# Patient Record
Sex: FEMALE | Race: BLACK OR AFRICAN AMERICAN | NOT HISPANIC OR LATINO | Employment: FULL TIME | URBAN - METROPOLITAN AREA
[De-identification: names, ages, dates, MRNs, and addresses within clinical notes are randomized per-mention and may not be internally consistent; named-entity substitution may affect disease eponyms.]

---

## 2022-11-01 ENCOUNTER — HOSPITAL ENCOUNTER (INPATIENT)
Facility: OTHER | Age: 62
LOS: 6 days | Discharge: HOME HEALTH | DRG: 871 | End: 2022-11-07
Attending: EMERGENCY MEDICINE | Admitting: HOSPITALIST
Payer: MEDICARE

## 2022-11-01 DIAGNOSIS — M25.552 HIP PAIN, ACUTE, LEFT: ICD-10-CM

## 2022-11-01 DIAGNOSIS — R94.31 PROLONGED Q-T INTERVAL ON ECG: ICD-10-CM

## 2022-11-01 DIAGNOSIS — J18.9 PNEUMONIA DUE TO INFECTIOUS ORGANISM, UNSPECIFIED LATERALITY, UNSPECIFIED PART OF LUNG: ICD-10-CM

## 2022-11-01 DIAGNOSIS — R78.81 GRAM-POSITIVE BACTEREMIA: Primary | ICD-10-CM

## 2022-11-01 DIAGNOSIS — R74.01 TRANSAMINITIS: ICD-10-CM

## 2022-11-01 DIAGNOSIS — M54.50 ACUTE LEFT-SIDED LOW BACK PAIN WITHOUT SCIATICA: ICD-10-CM

## 2022-11-01 DIAGNOSIS — N17.9 AKI (ACUTE KIDNEY INJURY): ICD-10-CM

## 2022-11-01 DIAGNOSIS — R78.81 BACTEREMIA: ICD-10-CM

## 2022-11-01 DIAGNOSIS — J18.9 PNEUMONIA: ICD-10-CM

## 2022-11-01 DIAGNOSIS — R07.9 CHEST PAIN: ICD-10-CM

## 2022-11-01 DIAGNOSIS — A41.9 SEPSIS, DUE TO UNSPECIFIED ORGANISM, UNSPECIFIED WHETHER ACUTE ORGAN DYSFUNCTION PRESENT: ICD-10-CM

## 2022-11-01 DIAGNOSIS — R52 PAIN: ICD-10-CM

## 2022-11-01 PROBLEM — R65.20 SEPSIS WITH ACUTE ORGAN DYSFUNCTION WITHOUT SEPTIC SHOCK: Status: ACTIVE | Noted: 2022-11-01

## 2022-11-01 PROBLEM — I10 PRIMARY HYPERTENSION: Status: ACTIVE | Noted: 2022-11-01

## 2022-11-01 PROBLEM — E11.9 TYPE 2 DIABETES MELLITUS, WITHOUT LONG-TERM CURRENT USE OF INSULIN: Status: ACTIVE | Noted: 2022-11-01

## 2022-11-01 LAB
ALBUMIN SERPL BCP-MCNC: 3.1 G/DL (ref 3.5–5.2)
ALP SERPL-CCNC: 379 U/L (ref 55–135)
ALT SERPL W/O P-5'-P-CCNC: 229 U/L (ref 10–44)
ANION GAP SERPL CALC-SCNC: 14 MMOL/L (ref 8–16)
APAP SERPL-MCNC: <3 UG/ML (ref 10–20)
AST SERPL-CCNC: 197 U/L (ref 10–40)
BACTERIA #/AREA URNS HPF: ABNORMAL /HPF
BASOPHILS # BLD AUTO: 0.03 K/UL (ref 0–0.2)
BASOPHILS NFR BLD: 0.2 % (ref 0–1.9)
BILIRUB SERPL-MCNC: 2.7 MG/DL (ref 0.1–1)
BILIRUB UR QL STRIP: NEGATIVE
BUN SERPL-MCNC: 27 MG/DL (ref 8–23)
CALCIUM SERPL-MCNC: 10.3 MG/DL (ref 8.7–10.5)
CHLORIDE SERPL-SCNC: 102 MMOL/L (ref 95–110)
CLARITY UR: CLEAR
CO2 SERPL-SCNC: 21 MMOL/L (ref 23–29)
COLOR UR: YELLOW
CREAT SERPL-MCNC: 1.6 MG/DL (ref 0.5–1.4)
CREAT SERPL-MCNC: 1.7 MG/DL (ref 0.5–1.4)
CTP QC/QA: YES
CTP QC/QA: YES
DIFFERENTIAL METHOD: ABNORMAL
EOSINOPHIL # BLD AUTO: 0 K/UL (ref 0–0.5)
EOSINOPHIL NFR BLD: 0.2 % (ref 0–8)
ERYTHROCYTE [DISTWIDTH] IN BLOOD BY AUTOMATED COUNT: 14.3 % (ref 11.5–14.5)
EST. GFR  (NO RACE VARIABLE): 33 ML/MIN/1.73 M^2
GLUCOSE SERPL-MCNC: 185 MG/DL (ref 70–110)
GLUCOSE UR QL STRIP: NEGATIVE
HAV IGM SERPL QL IA: NORMAL
HBV CORE IGM SERPL QL IA: NORMAL
HBV SURFACE AG SERPL QL IA: NORMAL
HCT VFR BLD AUTO: 34.8 % (ref 37–48.5)
HCV AB SERPL QL IA: NORMAL
HGB BLD-MCNC: 11.2 G/DL (ref 12–16)
HGB UR QL STRIP: ABNORMAL
IMM GRANULOCYTES # BLD AUTO: 0.16 K/UL (ref 0–0.04)
IMM GRANULOCYTES NFR BLD AUTO: 0.9 % (ref 0–0.5)
INR PPP: 1.1 (ref 0.8–1.2)
KETONES UR QL STRIP: NEGATIVE
LACTATE SERPL-SCNC: 0.9 MMOL/L (ref 0.5–2.2)
LACTATE SERPL-SCNC: 0.9 MMOL/L (ref 0.5–2.2)
LACTATE SERPL-SCNC: 1.4 MMOL/L (ref 0.5–2.2)
LEUKOCYTE ESTERASE UR QL STRIP: ABNORMAL
LYMPHOCYTES # BLD AUTO: 1.1 K/UL (ref 1–4.8)
LYMPHOCYTES NFR BLD: 6.2 % (ref 18–48)
MCH RBC QN AUTO: 23.8 PG (ref 27–31)
MCHC RBC AUTO-ENTMCNC: 32.2 G/DL (ref 32–36)
MCV RBC AUTO: 74 FL (ref 82–98)
MICROSCOPIC COMMENT: ABNORMAL
MONOCYTES # BLD AUTO: 1 K/UL (ref 0.3–1)
MONOCYTES NFR BLD: 5.6 % (ref 4–15)
NEUTROPHILS # BLD AUTO: 15.3 K/UL (ref 1.8–7.7)
NEUTROPHILS NFR BLD: 86.9 % (ref 38–73)
NITRITE UR QL STRIP: NEGATIVE
NON-SQ EPI CELLS #/AREA URNS HPF: 3 /HPF
NRBC BLD-RTO: 0 /100 WBC
PH UR STRIP: 6 [PH] (ref 5–8)
PLATELET # BLD AUTO: 186 K/UL (ref 150–450)
PLATELET BLD QL SMEAR: ABNORMAL
PMV BLD AUTO: 12.9 FL (ref 9.2–12.9)
POC MOLECULAR INFLUENZA A AGN: NEGATIVE
POC MOLECULAR INFLUENZA B AGN: NEGATIVE
POCT GLUCOSE: 130 MG/DL (ref 70–110)
POCT GLUCOSE: 181 MG/DL (ref 70–110)
POCT GLUCOSE: 99 MG/DL (ref 70–110)
POTASSIUM SERPL-SCNC: 3.5 MMOL/L (ref 3.5–5.1)
PROCALCITONIN SERPL IA-MCNC: 8.05 NG/ML
PROT SERPL-MCNC: 8.1 G/DL (ref 6–8.4)
PROT UR QL STRIP: NEGATIVE
PROTHROMBIN TIME: 11.7 SEC (ref 9–12.5)
RBC # BLD AUTO: 4.7 M/UL (ref 4–5.4)
RBC #/AREA URNS HPF: 1 /HPF (ref 0–4)
SAMPLE: ABNORMAL
SARS-COV-2 RDRP RESP QL NAA+PROBE: NEGATIVE
SODIUM SERPL-SCNC: 137 MMOL/L (ref 136–145)
SP GR UR STRIP: 1.01 (ref 1–1.03)
SQUAMOUS #/AREA URNS HPF: 3 /HPF
TROPONIN I SERPL DL<=0.01 NG/ML-MCNC: 0.01 NG/ML (ref 0–0.03)
TSH SERPL DL<=0.005 MIU/L-ACNC: 1.86 UIU/ML (ref 0.4–4)
URN SPEC COLLECT METH UR: ABNORMAL
UROBILINOGEN UR STRIP-ACNC: 1 EU/DL
WBC # BLD AUTO: 17.56 K/UL (ref 3.9–12.7)
WBC #/AREA URNS HPF: 51 /HPF (ref 0–5)
WBC CLUMPS URNS QL MICRO: ABNORMAL

## 2022-11-01 PROCEDURE — 80074 ACUTE HEPATITIS PANEL: CPT | Performed by: PHYSICIAN ASSISTANT

## 2022-11-01 PROCEDURE — 84484 ASSAY OF TROPONIN QUANT: CPT | Performed by: PHYSICIAN ASSISTANT

## 2022-11-01 PROCEDURE — 81000 URINALYSIS NONAUTO W/SCOPE: CPT | Performed by: PHYSICIAN ASSISTANT

## 2022-11-01 PROCEDURE — 87040 BLOOD CULTURE FOR BACTERIA: CPT | Mod: 59 | Performed by: PHYSICIAN ASSISTANT

## 2022-11-01 PROCEDURE — 99223 1ST HOSP IP/OBS HIGH 75: CPT | Mod: AI,,, | Performed by: NURSE PRACTITIONER

## 2022-11-01 PROCEDURE — 96361 HYDRATE IV INFUSION ADD-ON: CPT

## 2022-11-01 PROCEDURE — S0030 INJECTION, METRONIDAZOLE: HCPCS | Performed by: PHYSICIAN ASSISTANT

## 2022-11-01 PROCEDURE — 36415 COLL VENOUS BLD VENIPUNCTURE: CPT | Performed by: NURSE PRACTITIONER

## 2022-11-01 PROCEDURE — 36415 COLL VENOUS BLD VENIPUNCTURE: CPT | Performed by: PHYSICIAN ASSISTANT

## 2022-11-01 PROCEDURE — 82962 GLUCOSE BLOOD TEST: CPT

## 2022-11-01 PROCEDURE — 25000003 PHARM REV CODE 250: Performed by: NURSE PRACTITIONER

## 2022-11-01 PROCEDURE — 80143 DRUG ASSAY ACETAMINOPHEN: CPT | Performed by: PHYSICIAN ASSISTANT

## 2022-11-01 PROCEDURE — 25000003 PHARM REV CODE 250: Performed by: PHYSICIAN ASSISTANT

## 2022-11-01 PROCEDURE — 85610 PROTHROMBIN TIME: CPT | Performed by: PHYSICIAN ASSISTANT

## 2022-11-01 PROCEDURE — 87086 URINE CULTURE/COLONY COUNT: CPT | Performed by: PHYSICIAN ASSISTANT

## 2022-11-01 PROCEDURE — 99285 EMERGENCY DEPT VISIT HI MDM: CPT | Mod: 25

## 2022-11-01 PROCEDURE — 93005 ELECTROCARDIOGRAM TRACING: CPT

## 2022-11-01 PROCEDURE — 63600175 PHARM REV CODE 636 W HCPCS: Performed by: NURSE PRACTITIONER

## 2022-11-01 PROCEDURE — 87077 CULTURE AEROBIC IDENTIFY: CPT | Performed by: PHYSICIAN ASSISTANT

## 2022-11-01 PROCEDURE — 96367 TX/PROPH/DG ADDL SEQ IV INF: CPT

## 2022-11-01 PROCEDURE — 21400001 HC TELEMETRY ROOM

## 2022-11-01 PROCEDURE — 85025 COMPLETE CBC W/AUTO DIFF WBC: CPT | Performed by: PHYSICIAN ASSISTANT

## 2022-11-01 PROCEDURE — 96365 THER/PROPH/DIAG IV INF INIT: CPT

## 2022-11-01 PROCEDURE — 87186 SC STD MICRODIL/AGAR DIL: CPT | Performed by: PHYSICIAN ASSISTANT

## 2022-11-01 PROCEDURE — 99223 PR INITIAL HOSPITAL CARE,LEVL III: ICD-10-PCS | Mod: AI,,, | Performed by: NURSE PRACTITIONER

## 2022-11-01 PROCEDURE — 93010 EKG 12-LEAD: ICD-10-PCS | Mod: ,,, | Performed by: INTERNAL MEDICINE

## 2022-11-01 PROCEDURE — S0073 INJECTION, AZTREONAM, 500 MG: HCPCS | Performed by: PHYSICIAN ASSISTANT

## 2022-11-01 PROCEDURE — 87635 SARS-COV-2 COVID-19 AMP PRB: CPT | Performed by: PHYSICIAN ASSISTANT

## 2022-11-01 PROCEDURE — 83605 ASSAY OF LACTIC ACID: CPT | Mod: 91 | Performed by: NURSE PRACTITIONER

## 2022-11-01 PROCEDURE — 83605 ASSAY OF LACTIC ACID: CPT | Mod: 91 | Performed by: PHYSICIAN ASSISTANT

## 2022-11-01 PROCEDURE — 84145 PROCALCITONIN (PCT): CPT | Performed by: PHYSICIAN ASSISTANT

## 2022-11-01 PROCEDURE — 63600175 PHARM REV CODE 636 W HCPCS: Performed by: PHYSICIAN ASSISTANT

## 2022-11-01 PROCEDURE — 80053 COMPREHEN METABOLIC PANEL: CPT | Performed by: PHYSICIAN ASSISTANT

## 2022-11-01 PROCEDURE — 84443 ASSAY THYROID STIM HORMONE: CPT | Performed by: PHYSICIAN ASSISTANT

## 2022-11-01 PROCEDURE — 93010 ELECTROCARDIOGRAM REPORT: CPT | Mod: ,,, | Performed by: INTERNAL MEDICINE

## 2022-11-01 RX ORDER — GLUCAGON 1 MG
1 KIT INJECTION
Status: DISCONTINUED | OUTPATIENT
Start: 2022-11-01 | End: 2022-11-07 | Stop reason: HOSPADM

## 2022-11-01 RX ORDER — LOSARTAN POTASSIUM 100 MG/1
100 TABLET ORAL DAILY
Status: ON HOLD | COMMUNITY
Start: 2022-08-16 | End: 2022-11-07 | Stop reason: SDUPTHER

## 2022-11-01 RX ORDER — METRONIDAZOLE 500 MG/100ML
500 INJECTION, SOLUTION INTRAVENOUS
Status: DISCONTINUED | OUTPATIENT
Start: 2022-11-01 | End: 2022-11-01

## 2022-11-01 RX ORDER — GABAPENTIN 100 MG/1
200 CAPSULE ORAL 2 TIMES DAILY
Status: DISCONTINUED | OUTPATIENT
Start: 2022-11-02 | End: 2022-11-07 | Stop reason: HOSPADM

## 2022-11-01 RX ORDER — AMLODIPINE BESYLATE 5 MG/1
5 TABLET ORAL DAILY
Status: DISCONTINUED | OUTPATIENT
Start: 2022-11-01 | End: 2022-11-02

## 2022-11-01 RX ORDER — AMLODIPINE BESYLATE 5 MG/1
5 TABLET ORAL DAILY
Status: DISCONTINUED | OUTPATIENT
Start: 2022-11-02 | End: 2022-11-01

## 2022-11-01 RX ORDER — IBUPROFEN 200 MG
24 TABLET ORAL
Status: DISCONTINUED | OUTPATIENT
Start: 2022-11-01 | End: 2022-11-07 | Stop reason: HOSPADM

## 2022-11-01 RX ORDER — INSULIN ASPART 100 [IU]/ML
0-5 INJECTION, SOLUTION INTRAVENOUS; SUBCUTANEOUS
Status: DISCONTINUED | OUTPATIENT
Start: 2022-11-01 | End: 2022-11-07 | Stop reason: HOSPADM

## 2022-11-01 RX ORDER — ONDANSETRON 2 MG/ML
4 INJECTION INTRAMUSCULAR; INTRAVENOUS EVERY 6 HOURS PRN
Status: DISCONTINUED | OUTPATIENT
Start: 2022-11-01 | End: 2022-11-07 | Stop reason: HOSPADM

## 2022-11-01 RX ORDER — IBUPROFEN 600 MG/1
600 TABLET ORAL EVERY 6 HOURS PRN
Status: DISCONTINUED | OUTPATIENT
Start: 2022-11-01 | End: 2022-11-02

## 2022-11-01 RX ORDER — METHOCARBAMOL 750 MG/1
750 TABLET, FILM COATED ORAL ONCE
Status: COMPLETED | OUTPATIENT
Start: 2022-11-01 | End: 2022-11-01

## 2022-11-01 RX ORDER — SODIUM CHLORIDE 9 MG/ML
INJECTION, SOLUTION INTRAVENOUS CONTINUOUS
Status: DISCONTINUED | OUTPATIENT
Start: 2022-11-01 | End: 2022-11-03

## 2022-11-01 RX ORDER — ENOXAPARIN SODIUM 100 MG/ML
40 INJECTION SUBCUTANEOUS EVERY 24 HOURS
Status: DISCONTINUED | OUTPATIENT
Start: 2022-11-02 | End: 2022-11-07 | Stop reason: HOSPADM

## 2022-11-01 RX ORDER — IBRUTINIB 420 MG/1
1 TABLET, FILM COATED ORAL DAILY
COMMUNITY
Start: 2022-10-20

## 2022-11-01 RX ORDER — TIMOLOL MALEATE 5 MG/ML
1 SOLUTION OPHTHALMIC 2 TIMES DAILY
COMMUNITY
Start: 2022-10-17

## 2022-11-01 RX ORDER — IBUPROFEN 200 MG
16 TABLET ORAL
Status: DISCONTINUED | OUTPATIENT
Start: 2022-11-01 | End: 2022-11-07 | Stop reason: HOSPADM

## 2022-11-01 RX ORDER — LIDOCAINE 50 MG/G
1 PATCH TOPICAL
Status: DISCONTINUED | OUTPATIENT
Start: 2022-11-01 | End: 2022-11-07 | Stop reason: HOSPADM

## 2022-11-01 RX ORDER — CHLORTHALIDONE 25 MG/1
12.5 TABLET ORAL DAILY
Status: ON HOLD | COMMUNITY
Start: 2022-09-08 | End: 2022-11-07 | Stop reason: HOSPADM

## 2022-11-01 RX ORDER — POLYETHYLENE GLYCOL 3350 17 G/17G
17 POWDER, FOR SOLUTION ORAL 2 TIMES DAILY PRN
Status: DISCONTINUED | OUTPATIENT
Start: 2022-11-01 | End: 2022-11-07 | Stop reason: HOSPADM

## 2022-11-01 RX ORDER — HYDROCODONE BITARTRATE AND ACETAMINOPHEN 5; 325 MG/1; MG/1
1 TABLET ORAL EVERY 6 HOURS PRN
Status: DISCONTINUED | OUTPATIENT
Start: 2022-11-01 | End: 2022-11-02

## 2022-11-01 RX ORDER — AMLODIPINE BESYLATE 5 MG/1
5 TABLET ORAL DAILY
Status: ON HOLD | COMMUNITY
Start: 2022-11-01 | End: 2022-11-07 | Stop reason: HOSPADM

## 2022-11-01 RX ORDER — TIMOLOL MALEATE 2.5 MG/ML
1 SOLUTION/ DROPS OPHTHALMIC 2 TIMES DAILY
Status: DISCONTINUED | OUTPATIENT
Start: 2022-11-01 | End: 2022-11-07 | Stop reason: HOSPADM

## 2022-11-01 RX ORDER — GABAPENTIN 100 MG/1
200 CAPSULE ORAL 3 TIMES DAILY
Status: DISCONTINUED | OUTPATIENT
Start: 2022-11-01 | End: 2022-11-01

## 2022-11-01 RX ORDER — NALOXONE HCL 0.4 MG/ML
0.02 VIAL (ML) INJECTION
Status: DISCONTINUED | OUTPATIENT
Start: 2022-11-01 | End: 2022-11-07 | Stop reason: HOSPADM

## 2022-11-01 RX ADMIN — SODIUM CHLORIDE, SODIUM LACTATE, POTASSIUM CHLORIDE, AND CALCIUM CHLORIDE 1400 ML: .6; .31; .03; .02 INJECTION, SOLUTION INTRAVENOUS at 10:11

## 2022-11-01 RX ADMIN — CEFTRIAXONE 1 G: 1 INJECTION, SOLUTION INTRAVENOUS at 09:11

## 2022-11-01 RX ADMIN — LIDOCAINE 1 PATCH: 50 PATCH CUTANEOUS at 09:11

## 2022-11-01 RX ADMIN — VANCOMYCIN HYDROCHLORIDE 1500 MG: 1.5 INJECTION, POWDER, LYOPHILIZED, FOR SOLUTION INTRAVENOUS at 01:11

## 2022-11-01 RX ADMIN — AMLODIPINE BESYLATE 5 MG: 5 TABLET ORAL at 09:11

## 2022-11-01 RX ADMIN — TIMOLOL MALEATE 1 DROP: 2.5 SOLUTION/ DROPS OPHTHALMIC at 09:11

## 2022-11-01 RX ADMIN — AZTREONAM 2000 MG: 2 INJECTION, POWDER, LYOPHILIZED, FOR SOLUTION INTRAMUSCULAR; INTRAVENOUS at 12:11

## 2022-11-01 RX ADMIN — METRONIDAZOLE 500 MG: 5 INJECTION, SOLUTION INTRAVENOUS at 07:11

## 2022-11-01 RX ADMIN — SODIUM CHLORIDE: 0.9 INJECTION, SOLUTION INTRAVENOUS at 04:11

## 2022-11-01 RX ADMIN — METHOCARBAMOL 750 MG: 750 TABLET ORAL at 09:11

## 2022-11-01 RX ADMIN — HYDROCODONE BITARTRATE AND ACETAMINOPHEN 1 TABLET: 5; 325 TABLET ORAL at 09:11

## 2022-11-01 RX ADMIN — SODIUM CHLORIDE 1000 ML: 0.9 INJECTION, SOLUTION INTRAVENOUS at 09:11

## 2022-11-01 RX ADMIN — GABAPENTIN 200 MG: 100 CAPSULE ORAL at 04:11

## 2022-11-01 RX ADMIN — METRONIDAZOLE 500 MG: 5 INJECTION, SOLUTION INTRAVENOUS at 11:11

## 2022-11-01 NOTE — ED PROVIDER NOTES
Source of History:  Patient    Chief complaint:  Back Pain (Pt c.o left lower back pain that radiates to buttock and around in groin onset off and on for 2 weeks worsened over last 2 days.  Pt denies urinary symptoms.  Pt denies loss of bowel or bladder.  Pt states she has not been eating and drinking for last 2 day due to no appetite due to pain. Pt c.o feeling lightheaded. ) and hypotension      HPI:  Brigitte Sorto is a 62 y.o. female presenting with multiple complaints.  Patient states that she has left lower back pain that radiates to buttock and anterior hip.  States this has been ongoing intermittently for some time however worsened over the past 2 weeks.  She states over the past 2 days the pain was more significant and was exacerbated with bearing weight.  She states that secondary to this she was spending more time lying down.  She states this prompted her to have decreased intake.  She states today she felt some lightheadedness.  Denies any chest pain, shortness of breath abdominal pain urinary symptoms.  Denies any bowel or bladder incontinence.  Denies any unilateral weakness.    This is the extent to the patients complaints today here in the emergency department.    ROS: As per HPI and below:  Constitutional: No fever.  No chills, fatigue  Eyes: No visual changes.  ENT: No sore throat. No ear pain    Cardiovascular: No chest pain.  Respiratory: No shortness of breath.  GI: No abdominal pain.  No nausea or vomiting.  Genitourinary: No abnormal urination.  Neurologic: No headache. No focal weakness.  No numbness.  MSK:  Left lower back, buttock and groin pain; no unilateral weakness, no bowel or bladder incontinence  Integument: No rashes or lesions.  Hematologic: No easy bruising.  Endocrine: No excessive thirst or urination.    Review of patient's allergies indicates:   Allergen Reactions    Amoxicillin        PMH:  As per HPI and below:  Past Medical History:   Diagnosis Date    Cancer 5yrs ago  "   leukemia     CLL (chronic lymphocytic leukemia)     Diabetes mellitus      History reviewed. No pertinent surgical history.    Social History     Tobacco Use    Smoking status: Never    Smokeless tobacco: Never   Substance Use Topics    Alcohol use: No    Drug use: No       Physical Exam:    /61 (BP Location: Left arm, Patient Position: Lying)   Pulse 85   Temp 98.6 °F (37 °C) (Oral)   Resp 18   Ht 5' 5" (1.651 m)   Wt 78 kg (172 lb)   SpO2 97%   BMI 28.62 kg/m²   Nursing note and vital signs reviewed.  Constitutional: No acute distress.  Nontoxic  Eyes: No conjunctival injection.Extraocular muscles are intact.  ENT: Oropharynx clear.  Normal phonation.  Mucous membranes are dry.  Cardiovascular: Regular rate and rhythm.  No murmurs. No gallops. No rubs  Respiratory: Clear to auscultation bilaterally.  Good air movement.  No wheezes.  No rhonchi. No rales. No accessory muscle use..  Abdomen: Soft.  Not distended.  Nontender.  No guarding.  No rebound. Non-peritoneal.  Musculoskeletal: Good range of motion all joints.  No deformities.  Neck supple.  No meningismus.  Skin: No rashes seen.  Good turgor.  No abrasions.  No ecchymoses.  Neurologic: Motor intact.  Sensation intact.  No ataxia. No focal neurological deficits.  Psych: Appropriate, conversant    Labs that have been ordered have been independently reviewed and interpreted by myself.    I decided to obtain the patient's medical records.    MDM/ Differential Dx:    Brigitte Sorto 62 y.o. presented to the ED with c/o nontraumatic low back and left buttock pain.  Also reports lightheadedness.  Physical exam reveals ill however nontoxic.  Vitals are notable for hypotension.  Mucous membranes are dry.  No focal neuro deficit.  TTP of left lower paraspinal muscles and SI joint and left buttock with negative straight leg raise.  Strength equal bilaterally.  Abdomen is soft and nontender.    Differential Diagnosis includes, but is not limited " to:  Cauda equina syndrome, diskitis/osteomyelitis, epidural/paraspinal abscess, AAA, aortic dissection, post-op/hardware infection, trauma/vertebral fracture, spinal cord injury, disc herniation, spinal stenosis, sciatica, radiculopathy, neoplasm, lumbar muscle strain, muscle spasm, neuropathic pain, UTI/pyelonephritis, nephrolithiasis.    ED management:  Plan for imaging of lumbar, hip and pelvis and chest given her known history of cancer and nontraumatic back pain.  X-rays unremarkable.  Patient evaluated also for lightheadedness as she was noted to be hypotensive.  Responding well to IV fluids.  As noted previously given her leukocytosis which was expected although now left shift and presentation concern of sepsis.  Initial fluid bolus as per sepsis guidelines , lactic ordered.  Urine is pending.  Chest x-ray reveals questionable bilateral patchy opacities which could be related to pneumonia.  Given the transaminitis and this will start broad spectrum antibiotics.  We will also add acute hepatitis, Tylenol and ultrasound of gallbladder.  Ultrasound with no acute abnormalities.  Continues to respond to IV fluids.  Plan for admission for further monitoring and further evaluation of transaminitis and pneumonia.    Impression/Plan: Patient informed of diagnosis The primary encounter diagnosis was Acute left-sided low back pain without sciatica. Diagnoses of Pain, Sepsis, due to unspecified organism, unspecified whether acute organ dysfunction present, Pneumonia due to infectious organism, unspecified laterality, unspecified part of lung, Transaminitis, Pneumonia, Chest pain, and BAY (acute kidney injury) were also pertinent to this visit.       Results for orders placed or performed during the hospital encounter of 11/01/22   Blood culture x two cultures. Draw prior to antibiotics.    Specimen: Blood   Result Value Ref Range    Blood Culture, Routine       Gram stain aer bottle: Gram positive cocci in chains  resembling Strep    Blood Culture, Routine       Gram stain mary jane bottle: Gram positive cocci in chains resembling Strep    Blood Culture, Routine       Results called to and read back by: Joe Alegre RN 11/02/2022  05:00   Blood culture x two cultures. Draw prior to antibiotics.    Specimen: Blood   Result Value Ref Range    Blood Culture, Routine       Gram stain aer bottle: Gram positive cocci in chains resembling Strep    Blood Culture, Routine       Gram stain mary jane bottle: Gram positive cocci in chains resembling Strep    Blood Culture, Routine       Results called to and read back by: Joe Alegre RN 11/02/2022  05:00   CBC W/ AUTO DIFFERENTIAL   Result Value Ref Range    WBC 17.56 (H) 3.90 - 12.70 K/uL    RBC 4.70 4.00 - 5.40 M/uL    Hemoglobin 11.2 (L) 12.0 - 16.0 g/dL    Hematocrit 34.8 (L) 37.0 - 48.5 %    MCV 74 (L) 82 - 98 fL    MCH 23.8 (L) 27.0 - 31.0 pg    MCHC 32.2 32.0 - 36.0 g/dL    RDW 14.3 11.5 - 14.5 %    Platelets 186 150 - 450 K/uL    MPV 12.9 9.2 - 12.9 fL    Immature Granulocytes 0.9 (H) 0.0 - 0.5 %    Gran # (ANC) 15.3 (H) 1.8 - 7.7 K/uL    Immature Grans (Abs) 0.16 (H) 0.00 - 0.04 K/uL    Lymph # 1.1 1.0 - 4.8 K/uL    Mono # 1.0 0.3 - 1.0 K/uL    Eos # 0.0 0.0 - 0.5 K/uL    Baso # 0.03 0.00 - 0.20 K/uL    nRBC 0 0 /100 WBC    Gran % 86.9 (H) 38.0 - 73.0 %    Lymph % 6.2 (L) 18.0 - 48.0 %    Mono % 5.6 4.0 - 15.0 %    Eosinophil % 0.2 0.0 - 8.0 %    Basophil % 0.2 0.0 - 1.9 %    Platelet Estimate Appears normal     Differential Method Automated    Comp. Metabolic Panel   Result Value Ref Range    Sodium 137 136 - 145 mmol/L    Potassium 3.5 3.5 - 5.1 mmol/L    Chloride 102 95 - 110 mmol/L    CO2 21 (L) 23 - 29 mmol/L    Glucose 185 (H) 70 - 110 mg/dL    BUN 27 (H) 8 - 23 mg/dL    Creatinine 1.7 (H) 0.5 - 1.4 mg/dL    Calcium 10.3 8.7 - 10.5 mg/dL    Total Protein 8.1 6.0 - 8.4 g/dL    Albumin 3.1 (L) 3.5 - 5.2 g/dL    Total Bilirubin 2.7 (H) 0.1 - 1.0 mg/dL    Alkaline Phosphatase 379 (H)  55 - 135 U/L     (H) 10 - 40 U/L     (H) 10 - 44 U/L    Anion Gap 14 8 - 16 mmol/L    eGFR 33 (A) >60 mL/min/1.73 m^2   Troponin I   Result Value Ref Range    Troponin I 0.006 0.000 - 0.026 ng/mL   TSH   Result Value Ref Range    TSH 1.859 0.400 - 4.000 uIU/mL   Urinalysis, Reflex to Urine Culture Urine, Clean Catch    Specimen: Urine   Result Value Ref Range    Specimen UA Urine, Clean Catch     Color, UA Yellow Yellow, Straw, Sandra    Appearance, UA Clear Clear    pH, UA 6.0 5.0 - 8.0    Specific Gravity, UA 1.010 1.005 - 1.030    Protein, UA Negative Negative    Glucose, UA Negative Negative    Ketones, UA Negative Negative    Bilirubin (UA) Negative Negative    Occult Blood UA Trace (A) Negative    Nitrite, UA Negative Negative    Urobilinogen, UA 1.0 <2.0 EU/dL    Leukocytes, UA 3+ (A) Negative   Lactic Acid, Plasma #1   Result Value Ref Range    Lactate (Lactic Acid) 1.4 0.5 - 2.2 mmol/L   Lactic acid, plasma #2   Result Value Ref Range    Lactate (Lactic Acid) 0.9 0.5 - 2.2 mmol/L   Procalcitonin   Result Value Ref Range    Procalcitonin 8.05 (H) <0.25 ng/mL   Protime-INR   Result Value Ref Range    Prothrombin Time 11.7 9.0 - 12.5 sec    INR 1.1 0.8 - 1.2   Hepatitis panel, acute   Result Value Ref Range    Hepatitis B Surface Ag Non-reactive Non-reactive    Hep B C IgM Non-reactive Non-reactive    Hep A IgM Non-reactive Non-reactive    Hepatitis C Ab Non-reactive Non-reactive   Acetaminophen level   Result Value Ref Range    Acetaminophen (Tylenol), Serum <3.0 (L) 10.0 - 20.0 ug/mL   Urinalysis Microscopic   Result Value Ref Range    RBC, UA 1 0 - 4 /hpf    WBC, UA 51 (H) 0 - 5 /hpf    WBC Clumps, UA Rare None-Rare    Bacteria Many (A) None-Occ /hpf    Squam Epithel, UA 3 /hpf    Non-Squam Epith 3 (A) <1/hpf /hpf    Microscopic Comment SEE COMMENT    Lactic acid, plasma   Result Value Ref Range    Lactate (Lactic Acid) 0.9 0.5 - 2.2 mmol/L   Comprehensive Metabolic Panel (CMP)   Result Value  Ref Range    Sodium 139 136 - 145 mmol/L    Potassium 3.2 (L) 3.5 - 5.1 mmol/L    Chloride 110 95 - 110 mmol/L    CO2 24 23 - 29 mmol/L    Glucose 130 (H) 70 - 110 mg/dL    BUN 18 8 - 23 mg/dL    Creatinine 0.7 0.5 - 1.4 mg/dL    Calcium 9.3 8.7 - 10.5 mg/dL    Total Protein 6.8 6.0 - 8.4 g/dL    Albumin 2.4 (L) 3.5 - 5.2 g/dL    Total Bilirubin 1.1 (H) 0.1 - 1.0 mg/dL    Alkaline Phosphatase 345 (H) 55 - 135 U/L     (H) 10 - 40 U/L     (H) 10 - 44 U/L    Anion Gap 5 (L) 8 - 16 mmol/L    eGFR >60 >60 mL/min/1.73 m^2   Magnesium   Result Value Ref Range    Magnesium 1.6 1.6 - 2.6 mg/dL   Phosphorus   Result Value Ref Range    Phosphorus 2.3 (L) 2.7 - 4.5 mg/dL   CBC with Automated Differential   Result Value Ref Range    WBC 13.75 (H) 3.90 - 12.70 K/uL    RBC 4.09 4.00 - 5.40 M/uL    Hemoglobin 9.7 (L) 12.0 - 16.0 g/dL    Hematocrit 30.4 (L) 37.0 - 48.5 %    MCV 74 (L) 82 - 98 fL    MCH 23.7 (L) 27.0 - 31.0 pg    MCHC 31.9 (L) 32.0 - 36.0 g/dL    RDW 14.1 11.5 - 14.5 %    Platelets 142 (L) 150 - 450 K/uL    MPV 13.6 (H) 9.2 - 12.9 fL    Immature Granulocytes 0.9 (H) 0.0 - 0.5 %    Gran # (ANC) 11.0 (H) 1.8 - 7.7 K/uL    Immature Grans (Abs) 0.12 (H) 0.00 - 0.04 K/uL    Lymph # 1.3 1.0 - 4.8 K/uL    Mono # 1.3 (H) 0.3 - 1.0 K/uL    Eos # 0.0 0.0 - 0.5 K/uL    Baso # 0.03 0.00 - 0.20 K/uL    nRBC 0 0 /100 WBC    Gran % 79.9 (H) 38.0 - 73.0 %    Lymph % 9.6 (L) 18.0 - 48.0 %    Mono % 9.2 4.0 - 15.0 %    Eosinophil % 0.2 0.0 - 8.0 %    Basophil % 0.2 0.0 - 1.9 %    Differential Method Automated    BNP   Result Value Ref Range    BNP 84 0 - 99 pg/mL   Sedimentation rate   Result Value Ref Range    Sed Rate 104 (H) 0 - 20 mm/Hr   C-reactive protein   Result Value Ref Range    .8 (H) 0.0 - 8.2 mg/L   Vancomycin, random   Result Value Ref Range    Vancomycin, Random 5.7 Not established ug/mL   POCT COVID-19 Rapid Screening   Result Value Ref Range    POC Rapid COVID Negative Negative      Acceptable Yes    POCT Influenza A/B Molecular   Result Value Ref Range    POC Molecular Influenza A Ag Negative Negative, Not Reported    POC Molecular Influenza B Ag Negative Negative, Not Reported     Acceptable Yes    POCT glucose   Result Value Ref Range    POCT Glucose 181 (H) 70 - 110 mg/dL   ISTAT CREATININE   Result Value Ref Range    POC Creatinine 1.6 (H) 0.5 - 1.4 mg/dL    Sample VENOUS    POCT glucose   Result Value Ref Range    POCT Glucose 130 (H) 70 - 110 mg/dL   POCT glucose   Result Value Ref Range    POCT Glucose 99 70 - 110 mg/dL     Imaging Results              US Abdomen Limited (Final result)  Result time 11/01/22 14:11:49      Final result by Karen Encarnacion MD (11/01/22 14:11:49)                   Impression:      No significant sonographic abnormality.      Electronically signed by: Karen Encarnacion  Date:    11/01/2022  Time:    14:11               Narrative:    EXAMINATION:  US ABDOMEN LIMITED    CLINICAL HISTORY:  Transaminitis;    TECHNIQUE:  Limited ultrasound of the right upper quadrant of the abdomen focused on the biliary system was performed.    COMPARISON:  None    FINDINGS:  Gallbladder: No calculi, wall thickening, or pericholecystic fluid.  No sonographic Bolanos's sign.    Biliary system: The common duct is not dilated, measuring 5 mm.  No intrahepatic ductal dilatation.    Pancreas: The visualized portions of pancreas appear normal.    Miscellaneous: No upper abdominal ascites and no abnormalities of the visualized liver.                                       X-Ray Hip 2 or 3 views Left (with Pelvis when performed) (Final result)  Result time 11/01/22 10:30:03      Final result by Esteban Ambrocio MD (11/01/22 10:30:03)                   Impression:      Noting limitations above, no convincing evidence of acute displaced fracture or dislocation identified.      Electronically signed by: Esteban Ambrocio  Date:    11/01/2022  Time:    10:30                Narrative:    EXAMINATION:  XR HIP WITH PELVIS WHEN PERFORMED, 2 OR 3 VIEWS LEFT    CLINICAL HISTORY:  Pain, unspecified    TECHNIQUE:  AP view of the pelvis and frog leg lateral view of the left hip were performed.    COMPARISON:  None    FINDINGS:  Examination limited due to patient body habitus and bowel gas.    Noting this, no definite acute displaced fracture or dislocation is seen.  Degenerative findings of the spine, sacroiliac joints, pubic symphysis, both hip joints noted.    Phleboliths appear to project within the pelvis.                                       X-Ray Lumbar Spine Ap And Lateral (Final result)  Result time 11/01/22 10:28:44      Final result by Esteban Ambrocio MD (11/01/22 10:28:44)                   Impression:      No convincing evidence of acute fracture or traumatic subluxation.      Electronically signed by: Esteban Ambrocio  Date:    11/01/2022  Time:    10:28               Narrative:    EXAMINATION:  XR LUMBAR SPINE AP AND LATERAL    CLINICAL HISTORY:  pain;    TECHNIQUE:  AP, lateral and spot images were performed of the lumbar spine.    COMPARISON:  None    FINDINGS:  Question osseous demineralization.  Five non-rib-bearing lumbar-type vertebral bodies are identified.  No definite evidence of acute fracture.  Grade 1 anterolisthesis of L4 on L5 favored degenerative.  Degenerative endplate facet sclerosis noted at multiple levels.    Phleboliths appear to project within the pelvis.  Atherosclerotic calcifications of the aorta.                                       X-Ray Chest AP Portable (Final result)  Result time 11/01/22 10:26:50      Final result by Esteban Ambrocio MD (11/01/22 10:26:50)                   Impression:      Patchy opacities in the mid and lower lung zones could relate to atelectasis, aspiration or pneumonia.    Question small bilateral pleural effusions.      Electronically signed by: Esteban Ambrocio  Date:    11/01/2022  Time:    10:26               Narrative:     EXAMINATION:  XR CHEST AP PORTABLE    CLINICAL HISTORY:  Pain, unspecified    TECHNIQUE:  Single frontal view of the chest was performed.    COMPARISON:  Chest radiograph 02/05/2014.    FINDINGS:  Left-sided tunneled central venous catheter tip projects upper SVC.    Cardiac monitoring leads are noted.    Grossly unchanged cardiomediastinal contours.    Patchy opacities are noted in the mid lower lung zones.    No definite pneumothorax.  Small pleural effusions are not excluded.    No acute findings the visualized abdomen.  Osseous and soft tissue structures appear without definite acute abnormality.                                                Diagnostic Impression:    1. Acute left-sided low back pain without sciatica    2. Pain    3. Sepsis, due to unspecified organism, unspecified whether acute organ dysfunction present    4. Pneumonia due to infectious organism, unspecified laterality, unspecified part of lung    5. Transaminitis    6. Pneumonia    7. Chest pain    8. BAY (acute kidney injury)         ED Disposition Condition    Admit                   CARLOS Parson  11/02/22 1012

## 2022-11-01 NOTE — ED NOTES
Pt taken to er 10.  Pt became diaphoretic. Pt placed in gown and on cardiac monitors.  Er team at bedside.

## 2022-11-02 ENCOUNTER — PATIENT OUTREACH (OUTPATIENT)
Dept: ADMINISTRATIVE | Facility: OTHER | Age: 62
End: 2022-11-02
Payer: MEDICARE

## 2022-11-02 PROBLEM — R78.81 GRAM-POSITIVE BACTEREMIA: Status: ACTIVE | Noted: 2022-11-01

## 2022-11-02 PROBLEM — J18.9 PNEUMONIA: Status: RESOLVED | Noted: 2022-11-01 | Resolved: 2022-11-02

## 2022-11-02 LAB
ALBUMIN SERPL BCP-MCNC: 2.4 G/DL (ref 3.5–5.2)
ALP SERPL-CCNC: 345 U/L (ref 55–135)
ALT SERPL W/O P-5'-P-CCNC: 159 U/L (ref 10–44)
ANION GAP SERPL CALC-SCNC: 5 MMOL/L (ref 8–16)
AST SERPL-CCNC: 112 U/L (ref 10–40)
BACTERIA UR CULT: NORMAL
BASOPHILS # BLD AUTO: 0.03 K/UL (ref 0–0.2)
BASOPHILS NFR BLD: 0.2 % (ref 0–1.9)
BILIRUB SERPL-MCNC: 1.1 MG/DL (ref 0.1–1)
BNP SERPL-MCNC: 84 PG/ML (ref 0–99)
BUN SERPL-MCNC: 18 MG/DL (ref 8–23)
CALCIUM SERPL-MCNC: 9.3 MG/DL (ref 8.7–10.5)
CHLORIDE SERPL-SCNC: 110 MMOL/L (ref 95–110)
CO2 SERPL-SCNC: 24 MMOL/L (ref 23–29)
CREAT SERPL-MCNC: 0.7 MG/DL (ref 0.5–1.4)
CRP SERPL-MCNC: 332.8 MG/L (ref 0–8.2)
DIFFERENTIAL METHOD: ABNORMAL
EOSINOPHIL # BLD AUTO: 0 K/UL (ref 0–0.5)
EOSINOPHIL NFR BLD: 0.2 % (ref 0–8)
ERYTHROCYTE [DISTWIDTH] IN BLOOD BY AUTOMATED COUNT: 14.1 % (ref 11.5–14.5)
ERYTHROCYTE [SEDIMENTATION RATE] IN BLOOD: 104 MM/HR (ref 0–20)
EST. GFR  (NO RACE VARIABLE): >60 ML/MIN/1.73 M^2
ESTIMATED AVG GLUCOSE: 128 MG/DL (ref 68–131)
GLUCOSE SERPL-MCNC: 130 MG/DL (ref 70–110)
HBA1C MFR BLD: 6.1 % (ref 4–5.6)
HCT VFR BLD AUTO: 30.4 % (ref 37–48.5)
HGB BLD-MCNC: 9.7 G/DL (ref 12–16)
IMM GRANULOCYTES # BLD AUTO: 0.12 K/UL (ref 0–0.04)
IMM GRANULOCYTES NFR BLD AUTO: 0.9 % (ref 0–0.5)
LYMPHOCYTES # BLD AUTO: 1.3 K/UL (ref 1–4.8)
LYMPHOCYTES NFR BLD: 9.6 % (ref 18–48)
MAGNESIUM SERPL-MCNC: 1.6 MG/DL (ref 1.6–2.6)
MCH RBC QN AUTO: 23.7 PG (ref 27–31)
MCHC RBC AUTO-ENTMCNC: 31.9 G/DL (ref 32–36)
MCV RBC AUTO: 74 FL (ref 82–98)
MONOCYTES # BLD AUTO: 1.3 K/UL (ref 0.3–1)
MONOCYTES NFR BLD: 9.2 % (ref 4–15)
NEUTROPHILS # BLD AUTO: 11 K/UL (ref 1.8–7.7)
NEUTROPHILS NFR BLD: 79.9 % (ref 38–73)
NRBC BLD-RTO: 0 /100 WBC
PHOSPHATE SERPL-MCNC: 2.3 MG/DL (ref 2.7–4.5)
PLATELET # BLD AUTO: 142 K/UL (ref 150–450)
PMV BLD AUTO: 13.6 FL (ref 9.2–12.9)
POCT GLUCOSE: 112 MG/DL (ref 70–110)
POCT GLUCOSE: 149 MG/DL (ref 70–110)
POCT GLUCOSE: 163 MG/DL (ref 70–110)
POTASSIUM SERPL-SCNC: 3.2 MMOL/L (ref 3.5–5.1)
PROT SERPL-MCNC: 6.8 G/DL (ref 6–8.4)
RBC # BLD AUTO: 4.09 M/UL (ref 4–5.4)
SODIUM SERPL-SCNC: 139 MMOL/L (ref 136–145)
VANCOMYCIN SERPL-MCNC: 5.7 UG/ML
WBC # BLD AUTO: 13.75 K/UL (ref 3.9–12.7)

## 2022-11-02 PROCEDURE — 85651 RBC SED RATE NONAUTOMATED: CPT | Performed by: NURSE PRACTITIONER

## 2022-11-02 PROCEDURE — 25000003 PHARM REV CODE 250: Performed by: HOSPITALIST

## 2022-11-02 PROCEDURE — 83735 ASSAY OF MAGNESIUM: CPT | Performed by: NURSE PRACTITIONER

## 2022-11-02 PROCEDURE — 97162 PT EVAL MOD COMPLEX 30 MIN: CPT

## 2022-11-02 PROCEDURE — 25000003 PHARM REV CODE 250: Performed by: NURSE PRACTITIONER

## 2022-11-02 PROCEDURE — 63600175 PHARM REV CODE 636 W HCPCS: Performed by: HOSPITALIST

## 2022-11-02 PROCEDURE — 63600175 PHARM REV CODE 636 W HCPCS: Performed by: NURSE PRACTITIONER

## 2022-11-02 PROCEDURE — 99233 SBSQ HOSP IP/OBS HIGH 50: CPT | Mod: ,,, | Performed by: HOSPITALIST

## 2022-11-02 PROCEDURE — 80053 COMPREHEN METABOLIC PANEL: CPT | Performed by: NURSE PRACTITIONER

## 2022-11-02 PROCEDURE — 97535 SELF CARE MNGMENT TRAINING: CPT

## 2022-11-02 PROCEDURE — 83880 ASSAY OF NATRIURETIC PEPTIDE: CPT | Performed by: NURSE PRACTITIONER

## 2022-11-02 PROCEDURE — 86140 C-REACTIVE PROTEIN: CPT | Performed by: NURSE PRACTITIONER

## 2022-11-02 PROCEDURE — 11000001 HC ACUTE MED/SURG PRIVATE ROOM

## 2022-11-02 PROCEDURE — 84100 ASSAY OF PHOSPHORUS: CPT | Performed by: NURSE PRACTITIONER

## 2022-11-02 PROCEDURE — 80202 ASSAY OF VANCOMYCIN: CPT | Performed by: HOSPITALIST

## 2022-11-02 PROCEDURE — 99233 PR SUBSEQUENT HOSPITAL CARE,LEVL III: ICD-10-PCS | Mod: ,,, | Performed by: HOSPITALIST

## 2022-11-02 PROCEDURE — 25000003 PHARM REV CODE 250: Performed by: PHYSICIAN ASSISTANT

## 2022-11-02 PROCEDURE — 83036 HEMOGLOBIN GLYCOSYLATED A1C: CPT | Performed by: NURSE PRACTITIONER

## 2022-11-02 PROCEDURE — 97116 GAIT TRAINING THERAPY: CPT

## 2022-11-02 PROCEDURE — 97166 OT EVAL MOD COMPLEX 45 MIN: CPT

## 2022-11-02 PROCEDURE — 85025 COMPLETE CBC W/AUTO DIFF WBC: CPT | Performed by: NURSE PRACTITIONER

## 2022-11-02 RX ORDER — ACETAMINOPHEN 500 MG
1000 TABLET ORAL EVERY 8 HOURS PRN
Status: DISCONTINUED | OUTPATIENT
Start: 2022-11-02 | End: 2022-11-07 | Stop reason: HOSPADM

## 2022-11-02 RX ORDER — SODIUM CHLORIDE, SODIUM LACTATE, POTASSIUM CHLORIDE, CALCIUM CHLORIDE 600; 310; 30; 20 MG/100ML; MG/100ML; MG/100ML; MG/100ML
INJECTION, SOLUTION INTRAVENOUS CONTINUOUS
Status: ACTIVE | OUTPATIENT
Start: 2022-11-02 | End: 2022-11-02

## 2022-11-02 RX ORDER — POTASSIUM CHLORIDE 20 MEQ/1
40 TABLET, EXTENDED RELEASE ORAL ONCE
Status: COMPLETED | OUTPATIENT
Start: 2022-11-02 | End: 2022-11-02

## 2022-11-02 RX ORDER — OXYCODONE HYDROCHLORIDE 5 MG/1
5 TABLET ORAL EVERY 4 HOURS PRN
Status: DISCONTINUED | OUTPATIENT
Start: 2022-11-02 | End: 2022-11-07 | Stop reason: HOSPADM

## 2022-11-02 RX ORDER — VANCOMYCIN HCL IN 5 % DEXTROSE 1G/250ML
1000 PLASTIC BAG, INJECTION (ML) INTRAVENOUS
Status: DISCONTINUED | OUTPATIENT
Start: 2022-11-02 | End: 2022-11-04

## 2022-11-02 RX ADMIN — THERA TABS 1 TABLET: TAB at 10:11

## 2022-11-02 RX ADMIN — TIMOLOL MALEATE 1 DROP: 2.5 SOLUTION/ DROPS OPHTHALMIC at 09:11

## 2022-11-02 RX ADMIN — GABAPENTIN 200 MG: 100 CAPSULE ORAL at 08:11

## 2022-11-02 RX ADMIN — LIDOCAINE 1 PATCH: 50 PATCH CUTANEOUS at 10:11

## 2022-11-02 RX ADMIN — GABAPENTIN 200 MG: 100 CAPSULE ORAL at 10:11

## 2022-11-02 RX ADMIN — ENOXAPARIN SODIUM 40 MG: 100 INJECTION SUBCUTANEOUS at 10:11

## 2022-11-02 RX ADMIN — TIMOLOL MALEATE 1 DROP: 2.5 SOLUTION/ DROPS OPHTHALMIC at 08:11

## 2022-11-02 RX ADMIN — POTASSIUM CHLORIDE 40 MEQ: 1500 TABLET, EXTENDED RELEASE ORAL at 10:11

## 2022-11-02 RX ADMIN — CEFTRIAXONE 1 G: 1 INJECTION, SOLUTION INTRAVENOUS at 08:11

## 2022-11-02 RX ADMIN — VANCOMYCIN HYDROCHLORIDE 1000 MG: 1 INJECTION, POWDER, LYOPHILIZED, FOR SOLUTION INTRAVENOUS at 10:11

## 2022-11-02 RX ADMIN — CEFTRIAXONE 1 G: 1 INJECTION, SOLUTION INTRAVENOUS at 11:11

## 2022-11-02 RX ADMIN — SODIUM CHLORIDE, SODIUM LACTATE, POTASSIUM CHLORIDE, AND CALCIUM CHLORIDE: .6; .31; .03; .02 INJECTION, SOLUTION INTRAVENOUS at 10:11

## 2022-11-02 RX ADMIN — HYDROCODONE BITARTRATE AND ACETAMINOPHEN 1 TABLET: 5; 325 TABLET ORAL at 05:11

## 2022-11-02 RX ADMIN — OXYCODONE 5 MG: 5 TABLET ORAL at 08:11

## 2022-11-02 RX ADMIN — OXYCODONE 5 MG: 5 TABLET ORAL at 01:11

## 2022-11-02 RX ADMIN — SODIUM CHLORIDE: 0.9 INJECTION, SOLUTION INTRAVENOUS at 08:11

## 2022-11-02 NOTE — ASSESSMENT & PLAN NOTE
Granulomatous Inflammation of the Lung   Patient was diagnosed in 2008.  She is a patient of 's with [facility]. Last appointment was 10/26.  Pt gets a yearly CT chest to monitor lung nodules present since 2009. Pt take (Imbruvica) Ibrutinib daily.

## 2022-11-02 NOTE — PLAN OF CARE
Problem: Physical Therapy  Goal: Physical Therapy Goal  Description: Goals to be met by: 22    Patient will increase functional independence with mobility by performin. Sit<>stand with LRAD with SPV.  2. Gait x 100 feet with LRAD with SBA.  3. Ascend/descend 3 step(s) with least restrictive assistive device and SBA.      Outcome: Ongoing, Progressing     PT evaluation complete. Pt presents with painful L hip flexion seated/sidelying abduction upon strength testing and inability to form reciprocal gait pattern using RW and CGA however states that L hip pain improved with use of RW. All other hip active/passive ROM not painful. Pt educated on use of RW to offload L hip in order to assist muscle/tendon recovery during hospital stay. PT rec TBD at this time due to pending medical status.

## 2022-11-02 NOTE — PLAN OF CARE
VSS and Oriented x4. Back pain controlled with PRN medications. Continuous fluids infusing. Positive blood culture results received from Lab, notified Galo Faye NP during shift. Plan of care reviewed with patient. Purposeful rounding done, call light at bed side, bed at lowest position, brakes on, non-skid socks on, will continue to monitor.      Problem: Adult Inpatient Plan of Care  Goal: Optimal Comfort and Wellbeing  Outcome: Ongoing, Progressing     Problem: Renal Function Impairment (Acute Kidney Injury/Impairment)  Goal: Effective Renal Function  Outcome: Ongoing, Progressing     Problem: Respiratory Compromise (Pneumonia)  Goal: Effective Oxygenation and Ventilation  Outcome: Ongoing, Progressing     Problem: Adjustment to Illness (Sepsis/Septic Shock)  Goal: Optimal Coping  Outcome: Ongoing, Progressing

## 2022-11-02 NOTE — SUBJECTIVE & OBJECTIVE
Past Medical History:   Diagnosis Date    Cancer 5yrs ago    leukemia     CLL (chronic lymphocytic leukemia)     Diabetes mellitus        History reviewed. No pertinent surgical history.    Review of patient's allergies indicates:   Allergen Reactions    Amoxicillin        No current facility-administered medications on file prior to encounter.     Current Outpatient Medications on File Prior to Encounter   Medication Sig    amLODIPine (NORVASC) 5 MG tablet Take 5 mg by mouth once daily.    chlorthalidone (HYGROTEN) 25 MG Tab Take 12.5 mg by mouth once daily.    IMBRUVICA 420 mg Tab Take 1 tablet by mouth once daily.    losartan (COZAAR) 100 MG tablet Take 100 mg by mouth once daily.    multivitamin (THERAGRAN) per tablet Take 1 tablet by mouth once daily.    timolol maleate 0.5% (TIMOPTIC-XE) 0.5 % SolG Place 1 drop into both eyes 2 (two) times daily.    [DISCONTINUED] clindamycin (CLEOCIN) 150 MG capsule Take 150 mg by mouth 4 (four) times daily.     Family History       Problem Relation (Age of Onset)    Breast cancer Sister    Cancer Mother, Sister    Cirrhosis Father    Diabetes Brother    Hypertension Sister    Lung cancer Sister    Throat cancer Mother          Tobacco Use    Smoking status: Never    Smokeless tobacco: Never   Substance and Sexual Activity    Alcohol use: No    Drug use: No    Sexual activity: Not on file     Review of Systems   Constitutional:  Negative for activity change, chills and fever.   HENT:  Negative for congestion and trouble swallowing.    Eyes:  Negative for photophobia and visual disturbance.   Respiratory:  Negative for cough, shortness of breath and wheezing.    Cardiovascular:  Negative for chest pain, palpitations and leg swelling.   Gastrointestinal:  Negative for abdominal pain, diarrhea, nausea and vomiting.        BM yesterday   Endocrine: Negative for polydipsia and polyuria.   Genitourinary:  Negative for dysuria and frequency.   Musculoskeletal:  Positive for  arthralgias (Left hip, intermittent, with weight-bearing activities). Negative for gait problem.   Skin:  Negative for rash and wound.   Neurological:  Positive for weakness. Negative for dizziness, speech difficulty and headaches.   Psychiatric/Behavioral:  Negative for confusion and sleep disturbance.    Objective:     Vital Signs (Most Recent):  Temp: 100.2 °F (37.9 °C) (11/01/22 1951)  Pulse: 105 (11/01/22 2000)  Resp: 17 (11/01/22 1951)  BP: (!) 148/65 (11/01/22 1951)  SpO2: 96 % (11/01/22 1951)   Vital Signs (24h Range):  Temp:  [98.1 °F (36.7 °C)-100.2 °F (37.9 °C)] 100.2 °F (37.9 °C)  Pulse:  [] 105  Resp:  [17-24] 17  SpO2:  [95 %-98 %] 96 %  BP: ()/(52-70) 148/65     Weight: 78 kg (172 lb)  Body mass index is 28.62 kg/m².    Physical Exam  Vitals reviewed.   Constitutional:       General: She is not in acute distress.     Appearance: She is not ill-appearing.   HENT:      Head: Normocephalic and atraumatic.      Nose: No congestion.      Mouth/Throat:      Mouth: Mucous membranes are moist.      Pharynx: Oropharynx is clear.   Eyes:      General:         Right eye: No discharge.         Left eye: No discharge.      Extraocular Movements: Extraocular movements intact.      Pupils: Pupils are equal, round, and reactive to light.   Cardiovascular:      Rate and Rhythm: Normal rate and regular rhythm.      Heart sounds: Normal heart sounds. No murmur heard.  Pulmonary:      Effort: Pulmonary effort is normal. No respiratory distress.      Breath sounds: Normal breath sounds. No wheezing or rhonchi.   Abdominal:      General: Bowel sounds are normal. There is no distension.      Palpations: Abdomen is soft.      Tenderness: There is no abdominal tenderness. There is no guarding.   Musculoskeletal:         General: Tenderness (pain with extension of left leg) present. No swelling.      Cervical back: Normal range of motion. No rigidity or tenderness.      Comments: Decreased ROM to LLE s/t to pain    Skin:     General: Skin is warm.      Capillary Refill: Capillary refill takes less than 2 seconds.      Findings: No lesion or rash.   Neurological:      General: No focal deficit present.      Mental Status: She is alert and oriented to person, place, and time.      Sensory: No sensory deficit.      Gait: Gait normal.   Psychiatric:         Mood and Affect: Mood normal.         Behavior: Behavior normal.         CRANIAL NERVES     CN III, IV, VI   Pupils are equal, round, and reactive to light.     Significant Labs: All pertinent labs within the past 24 hours have been reviewed.  A1C: No results for input(s): HGBA1C in the last 4320 hours.  Blood Culture: No results for input(s): LABBLOO in the last 48 hours.  BMP:   Recent Labs   Lab 11/01/22  0912   *      K 3.5      CO2 21*   BUN 27*   CREATININE 1.7*   CALCIUM 10.3     CBC:   Recent Labs   Lab 11/01/22  0912   WBC 17.56*   HGB 11.2*   HCT 34.8*        CMP:   Recent Labs   Lab 11/01/22  0912      K 3.5      CO2 21*   *   BUN 27*   CREATININE 1.7*   CALCIUM 10.3   PROT 8.1   ALBUMIN 3.1*   BILITOT 2.7*   ALKPHOS 379*   *   *   ANIONGAP 14     Lactic Acid:   Recent Labs   Lab 11/01/22  1035 11/01/22  1520 11/01/22  1817   LACTATE 1.4 0.9 0.9     TSH:   Recent Labs   Lab 11/01/22  0912   TSH 1.859     Urine Culture: No results for input(s): LABURIN in the last 48 hours.  Urine Studies:   Recent Labs   Lab 11/01/22  1334   COLORU Yellow   APPEARANCEUA Clear   PHUR 6.0   SPECGRAV 1.010   PROTEINUA Negative   GLUCUA Negative   KETONESU Negative   BILIRUBINUA Negative   OCCULTUA Trace*   NITRITE Negative   UROBILINOGEN 1.0   LEUKOCYTESUR 3+*   RBCUA 1   WBCUA 51*   BACTERIA Many*   SQUAMEPITHEL 3       Significant Imaging: I have reviewed all pertinent imaging results/findings within the past 24 hours.  US Abdomen Limited  Narrative: EXAMINATION:  US ABDOMEN LIMITED    CLINICAL  HISTORY:  Transaminitis;    TECHNIQUE:  Limited ultrasound of the right upper quadrant of the abdomen focused on the biliary system was performed.    COMPARISON:  None    FINDINGS:  Gallbladder: No calculi, wall thickening, or pericholecystic fluid.  No sonographic Bolanos's sign.    Biliary system: The common duct is not dilated, measuring 5 mm.  No intrahepatic ductal dilatation.    Pancreas: The visualized portions of pancreas appear normal.    Miscellaneous: No upper abdominal ascites and no abnormalities of the visualized liver.  Impression: No significant sonographic abnormality.    Electronically signed by: Karen Encarnacion  Date:    11/01/2022  Time:    14:11  X-Ray Hip 2 or 3 views Left (with Pelvis when performed)  Narrative: EXAMINATION:  XR HIP WITH PELVIS WHEN PERFORMED, 2 OR 3 VIEWS LEFT    CLINICAL HISTORY:  Pain, unspecified    TECHNIQUE:  AP view of the pelvis and frog leg lateral view of the left hip were performed.    COMPARISON:  None    FINDINGS:  Examination limited due to patient body habitus and bowel gas.    Noting this, no definite acute displaced fracture or dislocation is seen.  Degenerative findings of the spine, sacroiliac joints, pubic symphysis, both hip joints noted.    Phleboliths appear to project within the pelvis.  Impression: Noting limitations above, no convincing evidence of acute displaced fracture or dislocation identified.    Electronically signed by: Esteban Ambrocio  Date:    11/01/2022  Time:    10:30  X-Ray Lumbar Spine Ap And Lateral  Narrative: EXAMINATION:  XR LUMBAR SPINE AP AND LATERAL    CLINICAL HISTORY:  pain;    TECHNIQUE:  AP, lateral and spot images were performed of the lumbar spine.    COMPARISON:  None    FINDINGS:  Question osseous demineralization.  Five non-rib-bearing lumbar-type vertebral bodies are identified.  No definite evidence of acute fracture.  Grade 1 anterolisthesis of L4 on L5 favored degenerative.  Degenerative endplate facet sclerosis noted at  multiple levels.    Phleboliths appear to project within the pelvis.  Atherosclerotic calcifications of the aorta.  Impression: No convincing evidence of acute fracture or traumatic subluxation.    Electronically signed by: Esteban Ambrocio  Date:    11/01/2022  Time:    10:28  X-Ray Chest AP Portable  Narrative: EXAMINATION:  XR CHEST AP PORTABLE    CLINICAL HISTORY:  Pain, unspecified    TECHNIQUE:  Single frontal view of the chest was performed.    COMPARISON:  Chest radiograph 02/05/2014.    FINDINGS:  Left-sided tunneled central venous catheter tip projects upper SVC.    Cardiac monitoring leads are noted.    Grossly unchanged cardiomediastinal contours.    Patchy opacities are noted in the mid lower lung zones.    No definite pneumothorax.  Small pleural effusions are not excluded.    No acute findings the visualized abdomen.  Osseous and soft tissue structures appear without definite acute abnormality.  Impression: Patchy opacities in the mid and lower lung zones could relate to atelectasis, aspiration or pneumonia.    Question small bilateral pleural effusions.    Electronically signed by: Esteban Ambrocio  Date:    11/01/2022  Time:    10:26

## 2022-11-02 NOTE — HPI
"Per Rosario Prado, BRIANNE:    "Ms. Briggs is 62-year-old female with a past medical history of chronic lymphocyte leukemia, diabetes type 2, and hypertension.  She came to the emergency department for left hip pain that began 1 month ago.  Patient describes the pain as sharp, intermittent, and worse with weight-bearing movement.  It radiates around her but to the front of her pelvic area.  It does come and go.  She has had some pain free days.  Patient has been taking about 4 Tylenol a day for the past month and has tried a lidocaine patch.  She has visited the ED multiple times for the same complaint.  Hip radiographs have been unrevealing.  Patient denies any trauma, lower abdominal pain, back or neck pain. While in the ED, patient was hypotensive.  Her blood work found elevated white blood cell count over 17 along with the Procalcitonin of 8.  Patient also had an acute kidney injury with a creatinine of 1.7.  She had elevated liver enzymes as well.  Sepsis protocol was initiated.  Patient admitted to hospital medicine for further management."  "

## 2022-11-02 NOTE — ASSESSMENT & PLAN NOTE
Patient denies abdominal pain, nausea, vomiting.  Incidentally found elevated liver enzymes.  No previous documented elevations.  -alkaline phosphatase: 370  -AST:  197  -ALT:  229  -total bilirubin: 2.7  -ultrasound abdominal limited:  No acute abnormalities, no ductal dilatation  -hepatitis panel:  Nonreactive  -acetaminophen level: less than 3  -Will monitor CMP and consult GI if no improvement

## 2022-11-02 NOTE — ASSESSMENT & PLAN NOTE
Patient with a creatinine of 1.7.  Her baseline appears to be 0.8.  Patient also has an elevated BUN of 27.  Will repeat hydrate and check BNP/CMP in the morning.  Will also be holding chlorthalidone and losartan.

## 2022-11-02 NOTE — ASSESSMENT & PLAN NOTE
Leukocytosis  Pneumonia  Urinary tract infection  Patient percentage with hypotension, leukocytosis, abnormal chest x-ray, urinary tract infection, and abnormal labs.  Her chief complaint is left hip pain.  Patient denies shortness of breath, chest pain, cough, abdominal pain, dysuria, and fevers.  -white blood cells 17.56, procalcitonin 8.05, lactic acid 1.4, repeat lactic acid 0.9  -chest x-ray:  Patchy opacities mid to lower zones which could be atelectasis aspiration, or pneumonia  -urinalysis:  Many bacteria, 51 white blood cells, 3+ leukocytes  -blood cultures and urine cultures pending  -patient received Azactam, Flagyl, and vanc in the ED. Due to negative hepatitis panel and abdominal ultrasound with no ductal  dilatation or other abnormalities, empiric antibiotics were to Rocephin and vancomycin since infective source is a urinary tract infection.

## 2022-11-02 NOTE — PT/OT/SLP EVAL
Physical Therapy Evaluation    Patient Name:  Brigitte Sorto   MRN:  [id number]    Recommendations:     Discharge Recommendations:  other (see comments) (TBD pending medical status)   Discharge Equipment Recommendations: walker, rolling   Barriers to discharge:  pending medical status    Assessment:     Brigitte Sorto is a 62 y.o. female admitted with a medical diagnosis of BAY (acute kidney injury).  She presents with the following impairments/functional limitations:  weakness, impaired functional mobility, gait instability, impaired balance, decreased lower extremity function, pain.    PT evaluation complete. Pt presents with painful L hip flexion seated/sidelying abduction upon strength testing and inability to form reciprocal gait pattern using RW and CGA however states that L hip pain improved with use of RW. All other hip active/passive ROM not painful. Pt educated on use of RW to offload L hip in order to assist muscle/tendon recovery during hospital stay. PT rec TBD at this time due to pending medical status.     Rehab Prognosis: Fair; patient would benefit from acute skilled PT services to address these deficits and reach maximum level of function.    Recent Surgery: * No surgery found *      Plan:     During this hospitalization, patient to be seen 3 x/week to address the identified rehab impairments via gait training, therapeutic activities, therapeutic exercises, neuromuscular re-education and progress toward the following goals:    Plan of Care Expires:  12/01/22    Subjective     Chief Complaint: hip hurts to walk on it  Patient/Family Comments/goals: return home without hip pain      Pain/Comfort:  Pain Rating 1: other (see comments) (not verbalized)  Location - Side 1: Left  Location - Orientation 1: generalized  Location 1: hip  Pain Addressed 1: Pre-medicate for activity, Reposition, Distraction, Cessation of Activity  Pain Rating Post-Intervention 1: other (see comments) (score not  verbalized)    Patients cultural, spiritual, Catholic conflicts given the current situation: no    Living Environment:  Lives in 1 story home with 3 steps to enter and has daughter helping her sometimes    Prior to admission, patients level of function was independent.  Equipment used at home: none.  DME owned (not currently used): none.  Upon discharge, patient will have assistance from daughter.    Objective:     Communicated with JEREMIAH Ghosh prior to session.  Patient found HOB elevated with peripheral IV  upon PT entry to room.    General Precautions: Standard,     Orthopedic Precautions:N/A   Braces: N/A  Respiratory Status: Room air    Exams:  RLE ROM: WFL  RLE Strength: WFL  LLE ROM: WFL except pain noted with active hip flexion/extension and active sidelying abduction  LLE Strength: WFL except hip flexion 3/5, hip abduction 3/5, hip extension *painful and limited    Functional Mobility:  Bed Mobility:     Scooting: moderate assistance x 2 persons to assist with returning to head of bed   Pt unable to bridge to scoot herself up due to L LE pain   Supine to Sit: stand by assistance  Transfers:     Sit to Stand:  contact guard assistance with rolling walker  Cueing needed for pushing up from bedside with UE instead of grabbing RW   Gait: ambulated 20 ft in room using RW and CGA with step-to pattern   Pt cued to put weight through UE while L LE       AM-PAC 6 CLICK MOBILITY  Total Score:17       Treatment & Education:    Cueing and demonstration required for sit<>stand strategy using UE to push up from bed instead of grabbing RW     Verbal cueing for reinforcement of using UE for decreased WB through L hip while using RW     Education provided for ability to use RW and assist in offsetting WB load to allow L LE pain to improve during hospital stay      Patient left HOB elevated with all lines intact, call button in reach, and Davina notified.    GOALS:   Multidisciplinary Problems       Physical Therapy Goals           Problem: Physical Therapy    Goal Priority Disciplines Outcome Goal Variances Interventions   Physical Therapy Goal     PT, PT/OT Ongoing, Progressing     Description: Goals to be met by: 22    Patient will increase functional independence with mobility by performin. Sit<>stand with LRAD with SPV.  2. Gait x 100 feet with LRAD with SBA.  3. Ascend/descend 3 step(s) with least restrictive assistive device and SBA.                           History:     Past Medical History:   Diagnosis Date    Cancer 5yrs ago    leukemia     CLL (chronic lymphocytic leukemia)     Diabetes mellitus        History reviewed. No pertinent surgical history.    Time Tracking:     PT Received On: 22  PT Start Time: 59     PT Stop Time: 1018  PT Total Time (min): 19 min     Billable Minutes: Evaluation 10 and Gait Training 9      2022

## 2022-11-02 NOTE — PT/OT/SLP EVAL
Occupational Therapy   Evaluation and Treatment    Name: Brigitte Sorto  MRN: [id number]  Admitting Diagnosis:  BAY (acute kidney injury)  Recent Surgery: * No surgery found *      Recommendations:     Discharge Recommendations:  (TBD though anticipate need for home health OT/PT)  Discharge Equipment Recommendations:   (may need RW though will defer AD needs to PT)  Barriers to discharge:  Inaccessible home environment (current functional level; 3 MELANIE home without handrails)    Assessment:   Initial OT eval/treat complete.  Requiring verbal cuing for RW use to decrease LLE weight bearing for pain management and CGA for ambulation task.  [place]/donning socks with cues for use of downward reaching with dorsiflexion/plantarflexion for task though able to use cross leg tech with increased time for LLE.  Toileting with SBA for clothing management in stance due to impaired balance/stability.  Needs cuing for safe RW positioning during hand hygiene task with SBA.  Has access to TTB not in use PTA as well as convenience height toilet.  May need RW though will defer AD needs to PT.  Post acute OT needs TBD though anticipate home health OT/PT.  To benefit from continued acute care OT services to increase independence in self-care/functional transfers.  OT to follow.     Brigitte Sorto is a 62 y.o. female with a medical diagnosis of BAY (acute kidney injury).  She presents with below deficits decreasing independence in self-care/functional transfers. Performance deficits affecting function: impaired endurance, impaired self care skills, impaired functional mobility, gait instability, impaired balance, decreased lower extremity function, pain, decreased ROM.      Rehab Prognosis: Good; patient would benefit from acute skilled OT services to address these deficits and reach maximum level of function.       Plan:     Patient to be seen 3 x/week to address the above listed problems via self-care/home management, therapeutic  "activities, therapeutic exercises  Plan of Care Expires: 11/16/22  Plan of Care Reviewed with: patient    Subjective     Chief Complaint: With c/o LLE hip pain worse after ambulation task.  Patient/Family Comments/goals: No goals stated at this time.  Pt. States, "I have to learn how to do it."    Occupational Profile:  Lives with daughter in [facility] with 3 MELANIE and 0 handrails; bathroom setup as tub/shower combo with TTB and convenience height toilet.  Previously independent in ADL, IADL, cooking, cleaning, and driving.   Equipment Used at Home:  none (has convenience height toilet and access to TTB not in use PTA)  Assistance upon Discharge: Daughter able to assist.     Pain/Comfort:  Pain Rating 1: 5/10  Location - Side 1: Left  Location - Orientation 1: generalized  Location 1: hip  Pain Rating Post-Intervention 1: 8/10    Patients cultural, spiritual, Congregation conflicts given the current situation:  (None stated.)    Objective:     Communicated with: Benji LOUIS RN prior to session via phone.  Patient found HOB elevated with peripheral IV upon OT entry to room.    General Precautions: Standard, fall, diabetic   Orthopedic Precautions:N/A   Braces: N/A  Respiratory Status: Room air    Occupational Performance:    Bed Mobility:    Supine<>sit with MOD I with HOB elevated when coming into sit and use of bed rails during task.     Functional Mobility/Transfers:  Sit>stand from EOB with CGA and ambulating approx. 6-feet with CGA, HHA, and forward flexed trunk, short steps, and feet/hips externally rotated.  Ambulating remaining distance to bathroom with RW, CGA/SBA for steadying/safety and verbal cuing for increased UE weight bearing while stepping with RLE to decrease LLE weight bearing and better manage pain during task; reported hip pain as 10/10 when ambulating without AD and 8/10 with AD.  Step transfer to toilet with use of grab bar for lowering.  Also requiring cuing for pushing RW to advance as Pt. Initially " lifting AD.      Activities of Daily Living:  Managed clothing with CGA in stance prior to toileting and cleaning front bibi hygiene while seated.  Hand hygiene in stance with verbal cuing for safe RW positioning/proximity at sink.  Donned hospital gown as would robe while seated threading LUE and draping across back though requiring incidental assist for snapping RUE into gown due to peripheral IV.  Doffed/donned socks seated EOB using cross leg tech with increased time for LLE and attempting the same for RLE though failing requiring verbal cuing for downward reaching again with increased time for task.     Cognitive/Visual Perceptual:  Cognitive/Psychosocial Skills:  -       Oriented to: Person, Place, Time, and Situation   -       Follows Commands/attention:Follows one-step commands  -       Communication: clear/fluent  -       Memory: No Deficits noted  -       Safety awareness/insight to disability: impaired   -       Mood/Affect/Coping skills/emotional control: Appropriate to situation and Cooperative  Visual/Perceptual:  -grossly intact    Physical Exam:  Postural examination/scapula alignment: -       Rounded shoulders  -       Forward head  -       slight forward flexed trunk in stance  Skin integrity: Visible skin intact  Edema:  None noted  Sensation: -       Intact  Dominant hand: -       R  Upper Extremity Range of Motion:  -       Right Upper Extremity: WFL  -       Left Upper Extremity: WFL  Upper Extremity Strength: -       Right Upper Extremity: 4-/5 to 4/5 GROSS  -       Left Upper Extremity: 4-/5 to 4/5 GROSS   Strength: -       Right Upper Extremity: WFL  -       Left Upper Extremity: WFL  Fine Motor Coordination: -       Intact  Left hand thumb/finger opposition skills and Right hand thumb/finger opposition skills    AMPA 6 Click ADL:  AMPAC Total Score: 18    Treatment & Education:  Educated on role of OT and POC.   Supine<>sit with MOD I with HOB elevated when coming into sit and use of  bed rails during task.   Sit>stand from EOB with CGA and ambulating approx. 6-feet with CGA, HHA, and forward flexed trunk, short steps, and feet/hips externally rotated.  Ambulating remaining distance to bathroom with RW, CGA/SBA for steadying/safety and verbal cuing for increased UE weight bearing while stepping with RLE to decrease LLE weight bearing and better manage pain during task; reported hip pain as 10/10 when ambulating without AD and 8/10 with AD.  Step transfer to toilet with use of grab bar for lowering.  Also requiring cuing for pushing RW to advance as Pt. Initially lifting AD.    Managed clothing with CGA in stance prior to toileting and cleaning front bibi hygiene while seated.  Hand hygiene in stance with verbal cuing for safe RW positioning/proximity at sink.  Donned hospital gown as would robe while seated threading LUE and draping across back though requiring incidental assist for snapping RUE into gown due to peripheral IV.  Doffed/donned socks seated EOB using cross leg tech with increased time for LLE and attempting the same for RLE though failing requiring verbal cuing for downward reaching again with increased time for task.   Received review of call light and importance of calling for assist as needed.     Patient left HOB elevated with all lines intact, call button in reach, and RN notified via secure chat after being unable to contact via phone.     GOALS:   Multidisciplinary Problems       Occupational Therapy Goals          Problem: Occupational Therapy    Goal Priority Disciplines Outcome Interventions   Occupational Therapy Goal     OT, PT/OT Ongoing, Progressing    Description: Goals to be met by: 11/16/2022     Patient will increase functional independence with ADLs by performing:    UE Dressing with Modified [place] with clothing retrieval.  LE Dressing with Modified [place] with clothing retrieval.  Grooming while standing at sink for 3 tasks with Modified  [place].  Toileting from toilet with Modified [place] for hygiene and clothing management.   Toilet transfer to toilet with Modified [place].                         History:     Past Medical History:   Diagnosis Date    Cancer 5yrs ago    leukemia     CLL (chronic lymphocytic leukemia)     Diabetes mellitus        History reviewed. No pertinent surgical history.    Time Tracking:     OT Date of Treatment: 11/02/22  OT Start Time: 1119  OT Stop Time: 1140  OT Total Time (min): 21 min    Billable Minutes:Evaluation 10  Self Care/Home Management 11    11/2/2022

## 2022-11-02 NOTE — PLAN OF CARE
PCP: Ace Kyle MD  Rx: [facility].      Pt uses no DME currently, may need RW for discharge.       11/02/22 1249   Discharge Assessment   Assessment Type Discharge Planning Assessment   Confirmed/corrected address, phone number and insurance Yes   Confirmed Demographics Correct on Facesheet   Source of Information patient   When was your last doctors appointment?   (pt states not recently)   Communicated PAULINE with patient/caregiver Date not available/Unable to determine   Reason For Admission BAY   Lives With child(jenelle), adult   Facility Arrived From: home   Do you expect to return to your current living situation? Yes   Do you have help at home or someone to help you manage your care at home? Yes   Prior to hospitilization cognitive status: Alert/Oriented;No Deficits   Current cognitive status: Alert/Oriented;No Deficits   Walking or Climbing Stairs Difficulty none   Dressing/Bathing Difficulty none   Home Layout Able to live on 1st floor   Equipment Currently Used at Home none   Readmission within 30 days? No   Patient currently being followed by outpatient case management? No   Do you currently have service(s) that help you manage your care at home? No   Do you take prescription medications? Yes   Do you have prescription coverage? Yes   Coverage Medicaid   Do you have any problems affording any of your prescribed medications? TBD   Is the patient taking medications as prescribed? yes   Who is going to help you get home at discharge? granddaughter   How do you get to doctors appointments? family or friend will provide;car, drives self   Are you on dialysis? No   Do you take coumadin? No   Discharge Plan A Home   Discharge Plan B Home   DME Needed Upon Discharge  walker, rolling  (to be decided)   Discharge Plan discussed with: Patient   Discharge Barriers Identified None   Physical Activity   On average, how many days per week do you engage in moderate to strenuous exercise (like a brisk walk)? 0  days   On average, how many minutes do you engage in exercise at this level? 0 min   Financial Resource Strain   How hard is it for you to pay for the very basics like food, housing, medical care, and heating? Somewhat   Housing Stability   In the last 12 months, was there a time when you were not able to pay the mortgage or rent on time? Y   In the last 12 months, how many places have you lived? 1   In the last 12 months, was there a time when you did not have a steady place to sleep or slept in a shelter (including now)? N   Transportation Needs   In the past 12 months, has lack of transportation kept you from medical appointments or from getting medications? no   In the past 12 months, has lack of transportation kept you from meetings, work, or from getting things needed for daily living? No   Food Insecurity   Within the past 12 months, you worried that your food would run out before you got the money to buy more. Sometimes   Within the past 12 months, the food you bought just didn't last and you didn't have money to get more. Never true   Stress   Do you feel stress - tense, restless, nervous, or anxious, or unable to sleep at night because your mind is troubled all the time - these days? Only a littl   Social Connections   In a typical week, how many times do you talk on the phone with family, friends, or neighbors? More than 3   How often do you get together with friends or relatives? More than 3   How often do you attend Nondenominational or Spiritism services? Never   Do you belong to any clubs or organizations such as Nondenominational groups, unions, fraternal or athletic groups, or school groups? Yes   How often do you attend meetings of the clubs or organizations you belong to? 1 to 4   Alcohol Use   Q1: How often do you have a drink containing alcohol? Never   Q2: How many drinks containing alcohol do you have on a typical day when you are drinking? None   Q3: How often do you have six or more drinks on one occasion? Never    Relationship/Environment   Name(s) of Who Lives With Patient daughter   [facility] - Med Surg ([facility])  Initial Discharge Assessment       Primary Care Provider: [facility]    Admission Diagnosis: Pneumonia [J18.9]  Pain [R52]  Transaminitis [R74.01]  Chest pain [R07.9]  Acute left-sided low back pain without sciatica [M54.50]  Pneumonia due to infectious organism, unspecified laterality, unspecified part of lung [J18.9]  Sepsis, due to unspecified organism, unspecified whether acute organ dysfunction present [A41.9]    Admission Date: 11/1/2022  Expected Discharge Date:     Discharge Barriers Identified: (P) None    Payor: MEDICARE / Plan: MEDICARE PART A & B / Product Type: Government /     Extended Emergency Contact Information  Primary Emergency Contact: Mag Sharp   [place]  Mobile Phone: [phone]  Relation: Other    Discharge Plan A: (P) Home  Discharge Plan B: (P) Home      [id number] [place], [place] - [address] [facility] [address]  [address]  [facility] [address]  Phone: [phone] Fax: [phone]      Initial Assessment (most recent)       Adult Discharge Assessment - 11/02/22 1249          Discharge Assessment    Assessment Type Discharge Planning Assessment     Confirmed/corrected address, phone number and insurance Yes     Confirmed Demographics Correct on Facesheet     Source of Information patient     When was your last doctors appointment? --   pt states not recently    Communicated PAULINE with patient/caregiver Date not available/Unable to determine     Reason For Admission BAY     Lives With child(jenelle), adult     Facility Arrived From: home     Do you expect to return to your current living situation? Yes     Do you have help at home or someone to help you manage your care at home? Yes     Prior to hospitilization cognitive status: Alert/Oriented;No Deficits (P)      Current cognitive  status: Alert/Oriented;No Deficits (P)      Walking or Climbing Stairs Difficulty none (P)      Dressing/Bathing Difficulty none (P)      Home Layout Able to live on 1st floor (P)      Equipment Currently Used at Home none (P)      Readmission within 30 days? No (P)      Patient currently being followed by outpatient case management? No (P)      Do you currently have service(s) that help you manage your care at home? No (P)      Do you take prescription medications? Yes (P)      Do you have prescription coverage? Yes (P)      Coverage Medicaid (P)      Do you have any problems affording any of your prescribed medications? TBD (P)      Is the patient taking medications as prescribed? yes (P)      Who is going to help you get home at discharge? granddaughter (P)      How do you get to doctors appointments? family or friend will provide;car, drives self (P)      Are you on dialysis? No (P)      Do you take coumadin? No (P)      Discharge Plan A Home (P)      Discharge Plan B Home (P)      DME Needed Upon Discharge  walker, rolling (P)    to be decided    Discharge Plan discussed with: Patient (P)      Discharge Barriers Identified None (P)         Physical Activity    On average, how many days per week do you engage in moderate to strenuous exercise (like a brisk walk)? 0 days (P)      On average, how many minutes do you engage in exercise at this level? 0 min (P)         Financial Resource Strain    How hard is it for you to pay for the very basics like food, housing, medical care, and heating? Somewhat hard (P)         Housing Stability    In the last 12 months, was there a time when you were not able to pay the mortgage or rent on time? Yes (P)      In the last 12 months, how many places have you lived? 1 (P)      In the last 12 months, was there a time when you did not have a steady place to sleep or slept in a shelter (including now)? No (P)         Transportation Needs    In the past 12 months, has lack of  transportation kept you from medical appointments or from getting medications? No (P)      In the past 12 months, has lack of transportation kept you from meetings, work, or from getting things needed for daily living? No (P)         Food Insecurity    Within the past 12 months, you worried that your food would run out before you got the money to buy more. Sometimes true (P)      Within the past 12 months, the food you bought just didn't last and you didn't have money to get more. Never true (P)         Stress    Do you feel stress - tense, restless, nervous, or anxious, or unable to sleep at night because your mind is troubled all the time - these days? Only a little (P)         Social Connections    In a typical week, how many times do you talk on the phone with family, friends, or neighbors? More than three times a week (P)      How often do you get together with friends or relatives? More than three times a week (P)      How often do you attend Moravian or Religion services? Never (P)      Do you belong to any clubs or organizations such as Moravian groups, unions, fraternal or athletic groups, or school groups? Yes (P)      How often do you attend meetings of the clubs or organizations you belong to? 1 to 4 times per year (P)         Alcohol Use    Q1: How often do you have a drink containing alcohol? Never (P)      Q2: How many drinks containing alcohol do you have on a typical day when you are drinking? Patient does not drink (P)      Q3: How often do you have six or more drinks on one occasion? Never (P)         Relationship/Environment    Name(s) of Who Lives With Patient daughter (P)

## 2022-11-02 NOTE — PROGRESS NOTES
Pharmacokinetic Initial Assessment: IV Vancomycin    Assessment/Plan:    Initiate intravenous vancomycin with loading dose of 1500 mg once with subsequent doses when random concentrations are less than 20 mcg/mL  Desired empiric serum trough concentration is 10 to 20 mcg/mL  Draw vancomycin random level on 11/2 at 0430.  Pharmacy will continue to follow and monitor vancomycin.      Please contact pharmacy at extension [phone] with any questions regarding this assessment.     Thank you for the consult,   Med Clarkefren       Patient brief summary:  Brigitte Sorto is a 62 y.o. female initiated on antimicrobial therapy with IV Vancomycin for treatment of suspected urinary tract infection    Drug Allergies:   Review of patient's allergies indicates:   Allergen Reactions    Amoxicillin        Actual Body Weight:   78 kg    Renal Function:   Estimated Creatinine Clearance: 34.5 mL/min (A) (based on SCr of 1.7 mg/dL (H)).,     Dialysis Method (if applicable):  N/A BAY    CBC (last 72 hours):  Recent Labs   Lab Result Units 11/01/22  0912   WBC K/uL 17.56*   Hemoglobin g/dL 11.2*   Hematocrit % 34.8*   Platelets K/uL 186   Gran % % 86.9*   Lymph % % 6.2*   Mono % % 5.6   Eosinophil % % 0.2   Basophil % % 0.2   Differential Method  Automated       Metabolic Panel (last 72 hours):  Recent Labs   Lab Result Units 11/01/22  0912 11/01/22  1334   Sodium mmol/L 137  --    Potassium mmol/L 3.5  --    Chloride mmol/L 102  --    CO2 mmol/L 21*  --    Glucose mg/dL 185*  --    Glucose, UA   --  Negative   BUN mg/dL 27*  --    Creatinine mg/dL 1.7*  --    Albumin g/dL 3.1*  --    Total Bilirubin mg/dL 2.7*  --    Alkaline Phosphatase U/L 379*  --    AST U/L 197*  --    ALT U/L 229*  --        Drug levels (last 3 results):  No results for input(s): VANCOMYCINRA, VANCORANDOM, VANCOMYCINPE, VANCOPEAK, VANCOMYCINTR, VANCOTROUGH in the last 72 hours.    Microbiologic Results:  Microbiology Results (last 7 days)       Procedure Component  Value Units Date/Time    Blood culture x two cultures. Draw prior to antibiotics. [[id number]] Collected: 11/01/22 1035    Order Status: Sent Specimen: Blood Updated: 11/01/22 1604    Blood culture x two cultures. Draw prior to antibiotics. [[id number]] Collected: 11/01/22 1035    Order Status: Sent Specimen: Blood Updated: 11/01/22 1604    Urine culture [[id number]] Collected: 11/01/22 1334    Order Status: No result Specimen: Urine Updated: 11/01/22 1446    Influenza A & B by Molecular [[id number]]     Order Status: Canceled Specimen: Nasopharyngeal Swab

## 2022-11-02 NOTE — ASSESSMENT & PLAN NOTE
Patient has had the hip pain for the past month.  She has had x-rays that were unrevealing.  The pain is not been controlled with Tylenol.  The pain does resolve when patient does not ambulate.  It is worse to the left lateral hip and at times radiates to her pelvis and to her left lumbar area.  She describes the pain as sharp.  The pain also causes decreased range of motion and weakness to the left lower extremity.  -x-ray of the hip and pelvis: Negative acute  -x-ray of the lumbar spine:  Degenerative changes, negative acute  -ESR, CRP pending  -MRI left hip: Evaluate for bursitis   -PT/OT

## 2022-11-02 NOTE — ASSESSMENT & PLAN NOTE
Last A1c in April was 7.1.  Patient is controlled with diet.  Will monitor blood glucose while inpatient and use low corrective dose sliding scale.

## 2022-11-02 NOTE — ASSESSMENT & PLAN NOTE
Pt was hypotensive on admission.  At home she is prescribed amlodipine 5 mg, chlorthalidone 12.5 mg, and losartan 100 mg daily.  Only giving amlodipine 5 mg, if blood pressure requires, at this time due to BAY.

## 2022-11-02 NOTE — H&P
[facility], [place], operated by [facility]  History & Physical    Patient Name: Brigitte Sorto  MRN: [id number]  Patient Class: IP- Inpatient  Admission Date: 11/1/2022  Attending Physician: Miah Burns MD   Primary Care Provider: [facility]         Patient information was obtained from patient and ER records.     Subjective:     Principal Problem:BAY (acute kidney injury)    Chief Complaint:   Chief Complaint   Patient presents with    Back Pain     Pt c.o left lower back pain that radiates to buttock and around in groin onset off and on for 2 weeks worsened over last 2 days.  Pt denies urinary symptoms.  Pt denies loss of bowel or bladder.  Pt states she has not been eating and drinking for last 2 day due to no appetite due to pain. Pt c.o feeling lightheaded.     hypotension        HPI: Ms Briggs is 62-year-old female with a past medical history of chronic lymphocyte leukemia, diabetes type 2, and hypertension.  She came to the emergency department for left hip pain that began 1 month ago.  Patient describes the pain as sharp, intermittent, and worse with weight-bearing movement.  It radiates around her but to the front of her pelvic area.  It does come and go.  She has had some pain free days.  Patient has been taking about 4 Tylenol a day for the past month and has tried a lidocaine patch.  She has visited the ED multiple times for the same complaint.  Hip radiographs have been unrevealing.  Patient denies any trauma, lower abdominal pain, back or neck pain. While in the ED, patient was hypotensive.  Her blood work found elevated white blood cell count over 17 along with the Procalcitonin of 8.  Patient also had an acute kidney injury with a creatinine of 1.7.  She had elevated liver enzymes as well.  Sepsis protocol was initiated.  Patient admitted to hospital medicine for further management.      Past Medical History:   Diagnosis Date    Cancer 5yrs ago    leukemia     CLL  (chronic lymphocytic leukemia)     Diabetes mellitus        History reviewed. No pertinent surgical history.    Review of patient's allergies indicates:   Allergen Reactions    Amoxicillin        No current facility-administered medications on file prior to encounter.     Current Outpatient Medications on File Prior to Encounter   Medication Sig    amLODIPine (NORVASC) 5 MG tablet Take 5 mg by mouth once daily.    chlorthalidone (HYGROTEN) 25 MG Tab Take 12.5 mg by mouth once daily.    IMBRUVICA 420 mg Tab Take 1 tablet by mouth once daily.    losartan (COZAAR) 100 MG tablet Take 100 mg by mouth once daily.    multivitamin (THERAGRAN) per tablet Take 1 tablet by mouth once daily.    timolol maleate 0.5% (TIMOPTIC-XE) 0.5 % SolG Place 1 drop into both eyes 2 (two) times daily.    [DISCONTINUED] clindamycin (CLEOCIN) 150 MG capsule Take 150 mg by mouth 4 (four) times daily.     Family History       Problem Relation (Age of Onset)    Breast cancer Sister    Cancer Mother, Sister    Cirrhosis Father    Diabetes Brother    Hypertension Sister    Lung cancer Sister    Throat cancer Mother          Tobacco Use    Smoking status: Never    Smokeless tobacco: Never   Substance and Sexual Activity    Alcohol use: No    Drug use: No    Sexual activity: Not on file     Review of Systems   Constitutional:  Negative for activity change, chills and fever.   HENT:  Negative for congestion and trouble swallowing.    Eyes:  Negative for photophobia and visual disturbance.   Respiratory:  Negative for cough, shortness of breath and wheezing.    Cardiovascular:  Negative for chest pain, palpitations and leg swelling.   Gastrointestinal:  Negative for abdominal pain, diarrhea, nausea and vomiting.        BM yesterday   Endocrine: Negative for polydipsia and polyuria.   Genitourinary:  Negative for dysuria and frequency.   Musculoskeletal:  Positive for arthralgias (Left hip, intermittent, with weight-bearing activities).  Negative for gait problem.   Skin:  Negative for rash and wound.   Neurological:  Positive for weakness. Negative for dizziness, speech difficulty and headaches.   Psychiatric/Behavioral:  Negative for confusion and sleep disturbance.    Objective:     Vital Signs (Most Recent):  Temp: 100.2 °F (37.9 °C) (11/01/22 1951)  Pulse: 105 (11/01/22 2000)  Resp: 17 (11/01/22 1951)  BP: (!) 148/65 (11/01/22 1951)  SpO2: 96 % (11/01/22 1951)   Vital Signs (24h Range):  Temp:  [98.1 °F (36.7 °C)-100.2 °F (37.9 °C)] 100.2 °F (37.9 °C)  Pulse:  [] 105  Resp:  [17-24] 17  SpO2:  [95 %-98 %] 96 %  BP: ()/(52-70) 148/65     Weight: 78 kg (172 lb)  Body mass index is 28.62 kg/m².    Physical Exam  Vitals reviewed.   Constitutional:       General: She is not in acute distress.     Appearance: She is not ill-appearing.   HENT:      Head: Normocephalic and atraumatic.      Nose: No congestion.      Mouth/Throat:      Mouth: Mucous membranes are moist.      Pharynx: Oropharynx is clear.   Eyes:      General:         Right eye: No discharge.         Left eye: No discharge.      Extraocular Movements: Extraocular movements intact.      Pupils: Pupils are equal, round, and reactive to light.   Cardiovascular:      Rate and Rhythm: Normal rate and regular rhythm.      Heart sounds: Normal heart sounds. No murmur heard.  Pulmonary:      Effort: Pulmonary effort is normal. No respiratory distress.      Breath sounds: Normal breath sounds. No wheezing or rhonchi.   Abdominal:      General: Bowel sounds are normal. There is no distension.      Palpations: Abdomen is soft.      Tenderness: There is no abdominal tenderness. There is no guarding.   Musculoskeletal:         General: Tenderness (pain with extension of left leg) present. No swelling.      Cervical back: Normal range of motion. No rigidity or tenderness.      Comments: Decreased ROM to LLE s/t to pain   Skin:     General: Skin is warm.      Capillary Refill: Capillary  refill takes less than 2 seconds.      Findings: No lesion or rash.   Neurological:      General: No focal deficit present.      Mental Status: She is alert and oriented to person, place, and time.      Sensory: No sensory deficit.      Gait: Gait normal.   Psychiatric:         Mood and Affect: Mood normal.         Behavior: Behavior normal.         CRANIAL NERVES     CN III, IV, VI   Pupils are equal, round, and reactive to light.     Significant Labs: All pertinent labs within the past 24 hours have been reviewed.  A1C: No results for input(s): HGBA1C in the last 4320 hours.  Blood Culture: No results for input(s): LABBLOO in the last 48 hours.  BMP:   Recent Labs   Lab 11/01/22  0912   *      K 3.5      CO2 21*   BUN 27*   CREATININE 1.7*   CALCIUM 10.3     CBC:   Recent Labs   Lab 11/01/22  0912   WBC 17.56*   HGB 11.2*   HCT 34.8*        CMP:   Recent Labs   Lab 11/01/22  0912      K 3.5      CO2 21*   *   BUN 27*   CREATININE 1.7*   CALCIUM 10.3   PROT 8.1   ALBUMIN 3.1*   BILITOT 2.7*   ALKPHOS 379*   *   *   ANIONGAP 14     Lactic Acid:   Recent Labs   Lab 11/01/22  1035 11/01/22  1520 11/01/22  1817   LACTATE 1.4 0.9 0.9     TSH:   Recent Labs   Lab 11/01/22  0912   TSH 1.859     Urine Culture: No results for input(s): LABURIN in the last 48 hours.  Urine Studies:   Recent Labs   Lab 11/01/22  1334   COLORU Yellow   APPEARANCEUA Clear   PHUR 6.0   SPECGRAV 1.010   PROTEINUA Negative   GLUCUA Negative   KETONESU Negative   BILIRUBINUA Negative   OCCULTUA Trace*   NITRITE Negative   UROBILINOGEN 1.0   LEUKOCYTESUR 3+*   RBCUA 1   WBCUA 51*   BACTERIA Many*   SQUAMEPITHEL 3       Significant Imaging: I have reviewed all pertinent imaging results/findings within the past 24 hours.  US Abdomen Limited  Narrative: EXAMINATION:  US ABDOMEN LIMITED    CLINICAL HISTORY:  Transaminitis;    TECHNIQUE:  Limited ultrasound of the right upper quadrant of the  abdomen focused on the biliary system was performed.    COMPARISON:  None    FINDINGS:  Gallbladder: No calculi, wall thickening, or pericholecystic fluid.  No sonographic Bolanos's sign.    Biliary system: The common duct is not dilated, measuring 5 mm.  No intrahepatic ductal dilatation.    Pancreas: The visualized portions of pancreas appear normal.    Miscellaneous: No upper abdominal ascites and no abnormalities of the visualized liver.  Impression: No significant sonographic abnormality.    Electronically signed by: Karen Encarnacion  Date:    11/01/2022  Time:    14:11  X-Ray Hip 2 or 3 views Left (with Pelvis when performed)  Narrative: EXAMINATION:  XR HIP WITH PELVIS WHEN PERFORMED, 2 OR 3 VIEWS LEFT    CLINICAL HISTORY:  Pain, unspecified    TECHNIQUE:  AP view of the pelvis and frog leg lateral view of the left hip were performed.    COMPARISON:  None    FINDINGS:  Examination limited due to patient body habitus and bowel gas.    Noting this, no definite acute displaced fracture or dislocation is seen.  Degenerative findings of the spine, sacroiliac joints, pubic symphysis, both hip joints noted.    Phleboliths appear to project within the pelvis.  Impression: Noting limitations above, no convincing evidence of acute displaced fracture or dislocation identified.    Electronically signed by: Esteban Ambrocio  Date:    11/01/2022  Time:    10:30  X-Ray Lumbar Spine Ap And Lateral  Narrative: EXAMINATION:  XR LUMBAR SPINE AP AND LATERAL    CLINICAL HISTORY:  pain;    TECHNIQUE:  AP, lateral and spot images were performed of the lumbar spine.    COMPARISON:  None    FINDINGS:  Question osseous demineralization.  Five non-rib-bearing lumbar-type vertebral bodies are identified.  No definite evidence of acute fracture.  Grade 1 anterolisthesis of L4 on L5 favored degenerative.  Degenerative endplate facet sclerosis noted at multiple levels.    Phleboliths appear to project within the pelvis.  Atherosclerotic  calcifications of the aorta.  Impression: No convincing evidence of acute fracture or traumatic subluxation.    Electronically signed by: Esteban Ambrocio  Date:    11/01/2022  Time:    10:28  X-Ray Chest AP Portable  Narrative: EXAMINATION:  XR CHEST AP PORTABLE    CLINICAL HISTORY:  Pain, unspecified    TECHNIQUE:  Single frontal view of the chest was performed.    COMPARISON:  Chest radiograph 02/05/2014.    FINDINGS:  Left-sided tunneled central venous catheter tip projects upper SVC.    Cardiac monitoring leads are noted.    Grossly unchanged cardiomediastinal contours.    Patchy opacities are noted in the mid lower lung zones.    No definite pneumothorax.  Small pleural effusions are not excluded.    No acute findings the visualized abdomen.  Osseous and soft tissue structures appear without definite acute abnormality.  Impression: Patchy opacities in the mid and lower lung zones could relate to atelectasis, aspiration or pneumonia.    Question small bilateral pleural effusions.    Electronically signed by: Esteban Ambrocio  Date:    11/01/2022  Time:    10:26      Assessment/Plan:     * BAY (acute kidney injury)  Patient with a creatinine of 1.7.  Her baseline appears to be 0.8.  Patient also has an elevated BUN of 27.  Will repeat hydrate and check BNP/CMP in the morning.  Will also be holding chlorthalidone and losartan.      Sepsis with acute organ dysfunction without septic shock  Leukocytosis  Pneumonia  Urinary tract infection  Patient percentage with hypotension, leukocytosis, abnormal chest x-ray, urinary tract infection, and abnormal labs.  Her chief complaint is left hip pain.  Patient denies shortness of breath, chest pain, cough, abdominal pain, dysuria, and fevers.  -white blood cells 17.56, procalcitonin 8.05, lactic acid 1.4, repeat lactic acid 0.9  -chest x-ray:  Patchy opacities mid to lower zones which could be atelectasis aspiration, or pneumonia  -urinalysis:  Many bacteria, 51 white blood  cells, 3+ leukocytes  -blood cultures and urine cultures pending  -patient received Azactam, Flagyl, and vanc in the ED. Due to negative hepatitis panel and abdominal ultrasound with no ductal  dilatation or other abnormalities, empiric antibiotics were to Rocephin and vancomycin since infective source is a urinary tract infection.          Hip pain, acute, left  Patient has had the hip pain for the past month.  She has had x-rays that were unrevealing.  The pain is not been controlled with Tylenol.  The pain does resolve when patient does not ambulate.  It is worse to the left lateral hip and at times radiates to her pelvis and to her left lumbar area.  She describes the pain as sharp.  The pain also causes decreased range of motion and weakness to the left lower extremity.  -x-ray of the hip and pelvis: Negative acute  -x-ray of the lumbar spine:  Degenerative changes, negative acute  -ESR, CRP pending  -MRI left hip: Evaluate for bursitis   -PT/OT      Transaminitis  Patient denies abdominal pain, nausea, vomiting.  Incidentally found elevated liver enzymes.  No previous documented elevations.  -alkaline phosphatase: 370  -AST:  197  -ALT:  229  -total bilirubin: 2.7  -ultrasound abdominal limited:  No acute abnormalities, no ductal dilatation  -hepatitis panel:  Nonreactive  -acetaminophen level: less than 3  -Will monitor CMP and consult GI if no improvement      CLL (chronic lymphocytic leukemia)  Granulomatous Inflammation of the Lung   Patient was diagnosed in 2008.  She is a patient of 's with [facility]. Last appointment was 10/26.  Pt gets a yearly CT chest to monitor lung nodules present since 2009. Pt take (Imbruvica) Ibrutinib daily.      Type 2 diabetes mellitus, without long-term current use of insulin  Last A1c in April was 7.1.  Patient is controlled with diet.  Will monitor blood glucose while inpatient and use low corrective dose sliding scale.    Primary hypertension  Pt was  hypotensive on admission.  At home she is prescribed amlodipine 5 mg, chlorthalidone 12.5 mg, and losartan 100 mg daily.  Only giving amlodipine 5 mg, if blood pressure requires, at this time due to BAY.        VTE Risk Mitigation (From admission, onward)         Ordered     enoxaparin injection 40 mg  Daily         11/01/22 1520     IP VTE HIGH RISK PATIENT  Once         11/01/22 1520     Place sequential compression device  Until discontinued         11/01/22 1520                   Rosario Prado DNP  Department of Hospital Medicine   [facility])

## 2022-11-02 NOTE — PLAN OF CARE
Problem: Occupational Therapy  Goal: Occupational Therapy Goal  Description: Goals to be met by: 11/16/2022     Patient will increase functional independence with ADLs by performing:    UE Dressing with Modified [place] with clothing retrieval.  LE Dressing with Modified [place] with clothing retrieval.  Grooming while standing at sink for 3 tasks with Modified [place].  Toileting from toilet with Modified [place] for hygiene and clothing management.   Toilet transfer to toilet with Modified [place].    Outcome: Ongoing, Progressing     Initial OT eval/treat complete.  Has access to TTB not in use PTA as well as convenience height toilet.  May need RW though will defer AD needs to PT.  Post acute OT needs TBD though anticipate home health OT/PT.  To benefit from continued acute care OT services to increase independence in self-care/functional transfers.  OT to follow.

## 2022-11-03 PROBLEM — N17.9 ACUTE KIDNEY INJURY: Status: RESOLVED | Noted: 2022-11-01 | Resolved: 2022-11-03

## 2022-11-03 LAB
ANION GAP SERPL CALC-SCNC: 12 MMOL/L (ref 8–16)
BASOPHILS # BLD AUTO: 0.03 K/UL (ref 0–0.2)
BASOPHILS NFR BLD: 0.2 % (ref 0–1.9)
BUN SERPL-MCNC: 13 MG/DL (ref 8–23)
CALCIUM SERPL-MCNC: 9.2 MG/DL (ref 8.7–10.5)
CHLORIDE SERPL-SCNC: 105 MMOL/L (ref 95–110)
CO2 SERPL-SCNC: 19 MMOL/L (ref 23–29)
CREAT SERPL-MCNC: 0.7 MG/DL (ref 0.5–1.4)
DIFFERENTIAL METHOD: ABNORMAL
EOSINOPHIL # BLD AUTO: 0.1 K/UL (ref 0–0.5)
EOSINOPHIL NFR BLD: 0.5 % (ref 0–8)
ERYTHROCYTE [DISTWIDTH] IN BLOOD BY AUTOMATED COUNT: 14.6 % (ref 11.5–14.5)
EST. GFR  (NO RACE VARIABLE): >60 ML/MIN/1.73 M^2
GLUCOSE SERPL-MCNC: 77 MG/DL (ref 70–110)
HCT VFR BLD AUTO: 29.8 % (ref 37–48.5)
HGB BLD-MCNC: 9.5 G/DL (ref 12–16)
IMM GRANULOCYTES # BLD AUTO: 0.08 K/UL (ref 0–0.04)
IMM GRANULOCYTES NFR BLD AUTO: 0.5 % (ref 0–0.5)
LYMPHOCYTES # BLD AUTO: 2.8 K/UL (ref 1–4.8)
LYMPHOCYTES NFR BLD: 19.1 % (ref 18–48)
MAGNESIUM SERPL-MCNC: 1.8 MG/DL (ref 1.6–2.6)
MCH RBC QN AUTO: 23.7 PG (ref 27–31)
MCHC RBC AUTO-ENTMCNC: 31.9 G/DL (ref 32–36)
MCV RBC AUTO: 74 FL (ref 82–98)
MONOCYTES # BLD AUTO: 1.3 K/UL (ref 0.3–1)
MONOCYTES NFR BLD: 8.7 % (ref 4–15)
NEUTROPHILS # BLD AUTO: 10.3 K/UL (ref 1.8–7.7)
NEUTROPHILS NFR BLD: 71 % (ref 38–73)
NRBC BLD-RTO: 0 /100 WBC
PHOSPHATE SERPL-MCNC: 1.9 MG/DL (ref 2.7–4.5)
PLATELET # BLD AUTO: 159 K/UL (ref 150–450)
PMV BLD AUTO: 13.8 FL (ref 9.2–12.9)
POCT GLUCOSE: 100 MG/DL (ref 70–110)
POCT GLUCOSE: 103 MG/DL (ref 70–110)
POCT GLUCOSE: 124 MG/DL (ref 70–110)
POCT GLUCOSE: 128 MG/DL (ref 70–110)
POCT GLUCOSE: 148 MG/DL (ref 70–110)
POCT GLUCOSE: 168 MG/DL (ref 70–110)
POTASSIUM SERPL-SCNC: 5 MMOL/L (ref 3.5–5.1)
RBC # BLD AUTO: 4.01 M/UL (ref 4–5.4)
SODIUM SERPL-SCNC: 136 MMOL/L (ref 136–145)
WBC # BLD AUTO: 14.57 K/UL (ref 3.9–12.7)

## 2022-11-03 PROCEDURE — 84100 ASSAY OF PHOSPHORUS: CPT | Performed by: HOSPITALIST

## 2022-11-03 PROCEDURE — 63600175 PHARM REV CODE 636 W HCPCS: Performed by: NURSE PRACTITIONER

## 2022-11-03 PROCEDURE — 25000003 PHARM REV CODE 250: Performed by: NURSE PRACTITIONER

## 2022-11-03 PROCEDURE — 99233 PR SUBSEQUENT HOSPITAL CARE,LEVL III: ICD-10-PCS | Mod: ,,, | Performed by: HOSPITALIST

## 2022-11-03 PROCEDURE — 63600175 PHARM REV CODE 636 W HCPCS: Performed by: HOSPITALIST

## 2022-11-03 PROCEDURE — 11000001 HC ACUTE MED/SURG PRIVATE ROOM

## 2022-11-03 PROCEDURE — 97116 GAIT TRAINING THERAPY: CPT

## 2022-11-03 PROCEDURE — 85025 COMPLETE CBC W/AUTO DIFF WBC: CPT | Performed by: HOSPITALIST

## 2022-11-03 PROCEDURE — 87040 BLOOD CULTURE FOR BACTERIA: CPT | Performed by: HOSPITALIST

## 2022-11-03 PROCEDURE — 97535 SELF CARE MNGMENT TRAINING: CPT

## 2022-11-03 PROCEDURE — 99223 1ST HOSP IP/OBS HIGH 75: CPT | Mod: ,,, | Performed by: INTERNAL MEDICINE

## 2022-11-03 PROCEDURE — 36415 COLL VENOUS BLD VENIPUNCTURE: CPT | Performed by: HOSPITALIST

## 2022-11-03 PROCEDURE — 25000003 PHARM REV CODE 250: Performed by: HOSPITALIST

## 2022-11-03 PROCEDURE — 83735 ASSAY OF MAGNESIUM: CPT | Performed by: HOSPITALIST

## 2022-11-03 PROCEDURE — 99233 SBSQ HOSP IP/OBS HIGH 50: CPT | Mod: ,,, | Performed by: HOSPITALIST

## 2022-11-03 PROCEDURE — 25000003 PHARM REV CODE 250: Performed by: PHYSICIAN ASSISTANT

## 2022-11-03 PROCEDURE — 99223 PR INITIAL HOSPITAL CARE,LEVL III: ICD-10-PCS | Mod: ,,, | Performed by: INTERNAL MEDICINE

## 2022-11-03 PROCEDURE — 80048 BASIC METABOLIC PNL TOTAL CA: CPT | Performed by: HOSPITALIST

## 2022-11-03 RX ORDER — CELECOXIB 100 MG/1
100 CAPSULE ORAL 2 TIMES DAILY
Status: DISCONTINUED | OUTPATIENT
Start: 2022-11-03 | End: 2022-11-07 | Stop reason: HOSPADM

## 2022-11-03 RX ADMIN — TIMOLOL MALEATE 1 DROP: 2.5 SOLUTION/ DROPS OPHTHALMIC at 09:11

## 2022-11-03 RX ADMIN — VANCOMYCIN HYDROCHLORIDE 1000 MG: 1 INJECTION, POWDER, LYOPHILIZED, FOR SOLUTION INTRAVENOUS at 01:11

## 2022-11-03 RX ADMIN — LIDOCAINE 1 PATCH: 50 PATCH CUTANEOUS at 10:11

## 2022-11-03 RX ADMIN — OXYCODONE 5 MG: 5 TABLET ORAL at 01:11

## 2022-11-03 RX ADMIN — GABAPENTIN 200 MG: 100 CAPSULE ORAL at 08:11

## 2022-11-03 RX ADMIN — OXYCODONE 5 MG: 5 TABLET ORAL at 06:11

## 2022-11-03 RX ADMIN — THERA TABS 1 TABLET: TAB at 08:11

## 2022-11-03 RX ADMIN — ENOXAPARIN SODIUM 40 MG: 100 INJECTION SUBCUTANEOUS at 08:11

## 2022-11-03 RX ADMIN — CEFTRIAXONE 2 G: 2 INJECTION, SOLUTION INTRAVENOUS at 09:11

## 2022-11-03 RX ADMIN — OXYCODONE 5 MG: 5 TABLET ORAL at 08:11

## 2022-11-03 RX ADMIN — TIMOLOL MALEATE 1 DROP: 2.5 SOLUTION/ DROPS OPHTHALMIC at 08:11

## 2022-11-03 RX ADMIN — CELECOXIB 100 MG: 100 CAPSULE ORAL at 10:11

## 2022-11-03 NOTE — PROGRESS NOTES
"[facility]  Progress Note    Patient Name: Brigitte Sorto  MRN: [id number]  Patient Class: IP- Inpatient   Admission Date: 11/1/2022  Length of Stay: 1 days  Attending Physician: Miah Burns MD  Primary Care Provider: Ace Kyle MD        Subjective:     Principal Problem:Gram-positive bacteremia        HPI:  Per Rosario Prado, DNP:    "Ms. Briggs is 62-year-old female with a past medical history of chronic lymphocyte leukemia, diabetes type 2, and hypertension.  She came to the emergency department for left hip pain that began 1 month ago.  Patient describes the pain as sharp, intermittent, and worse with weight-bearing movement.  It radiates around her but to the front of her pelvic area.  It does come and go.  She has had some pain free days.  Patient has been taking about 4 Tylenol a day for the past month and has tried a lidocaine patch.  She has visited the ED multiple times for the same complaint.  Hip radiographs have been unrevealing.  Patient denies any trauma, lower abdominal pain, back or neck pain. While in the ED, patient was hypotensive.  Her blood work found elevated white blood cell count over 17 along with the Procalcitonin of 8.  Patient also had an acute kidney injury with a creatinine of 1.7.  She had elevated liver enzymes as well.  Sepsis protocol was initiated.  Patient admitted to hospital medicine for further management."      Overview/Hospital Course:  Patient is a 62 year-old woman with hypertension, diabetes mellitus type II, chronic lymphocyte leukemia who presented worsening/persistent left hip pain.  Lab studies show elevated inflammatory markers.  Plain film unremarkable. Follow-up MRI  of the left hip negative for fracture, marrow infiltrative process, or abscess and revealed inflammation of gluteus minimus and medius with likely partial tear.  Patient also presents with evidence of acute kidney injury which is improve with volume " resuscitation.        Interval History: Patient reports left hip pain severe and interfering with ambulation.    Review of Systems   Constitutional:  Negative for chills and fever.   Respiratory:  Negative for shortness of breath.    Cardiovascular:  Negative for chest pain.   Gastrointestinal:  Negative for abdominal pain, nausea and vomiting.   Genitourinary:  Negative for dysuria and frequency.   Musculoskeletal:  Positive for arthralgias.     Objective:     Vital Signs (Most Recent):  Temp: 99.5 °F (37.5 °C) (11/02/22 2023)  Pulse: 98 (11/02/22 2023)  Resp: 15 (11/02/22 2026)  BP: (!) 123/58 (11/02/22 2023)  SpO2: 96 % (11/02/22 2023)   Vital Signs (24h Range):  Temp:  [97.9 °F (36.6 °C)-99.6 °F (37.6 °C)] 99.5 °F (37.5 °C)  Pulse:  [85-98] 98  Resp:  [14-18] 15  SpO2:  [93 %-97 %] 96 %  BP: (114-133)/(53-64) 123/58     Weight: 78 kg (172 lb)  Body mass index is 28.62 kg/m².    Intake/Output Summary (Last 24 hours) at 11/2/2022 2211  Last data filed at 11/2/2022 1934  Gross per 24 hour   Intake 1680 ml   Output --   Net 1680 ml      Physical Exam  Constitutional:       General: She is not in acute distress.  HENT:      Head: Atraumatic.   Eyes:      Conjunctiva/sclera: Conjunctivae normal.   Cardiovascular:      Rate and Rhythm: Normal rate and regular rhythm.      Heart sounds: Normal heart sounds. No murmur heard.  Pulmonary:      Effort: Pulmonary effort is normal.      Breath sounds: Normal breath sounds. No wheezing.   Abdominal:      General: Bowel sounds are normal. There is no distension.      Palpations: Abdomen is soft.      Tenderness: There is no abdominal tenderness.   Musculoskeletal:         General: Tenderness present. No deformity. Normal range of motion.      Cervical back: Neck supple.      Comments: Left hip tender from left buttocks which radiates to pelvis.   Neurological:      Mental Status: She is alert and oriented to person, place, and time.       Significant Labs: All pertinent labs  within the past 24 hours have been reviewed.    Significant Imaging: I have reviewed all pertinent imaging results/findings within the past 24 hours.        Assessment/Plan:      * Gram-positive bacteremia  Patient with elevated white count, inflammatory markers including elevated procalcitonin.  Patient with 2/2 positive blood cultures with gram positive cocci.  Improving with antibiotics.  Continue antibiotic therapy with increased dose of ceftriaxone.  Source unclear.  Despite chest radiograph findings patient's clinical picture not consistent with pneumonia.  Concern for hip infection however MRI not clear for infection.  Consult ID.    Hip pain, acute, left  No history of trauma.  Plain film unremarkable. Non-specific inflammatory markers elevated. Follow-up MRI concerning for inflammation of gluteus minimus and tendinosis with likely partial tear but without fracture, infiltrative process.  Continue therapy and pain control.  Consult orthopedic surgery for evaluation and further treatment recommendations.    Acute kidney injury  Improved with fluids.  Continue to hold chlorthalidone and losartan.  Repeat labs tomorrow.    Hypertension  Hold blood pressure medication to prevent hypotension.      Transaminitis  Patient denies abdominal pain, nausea, vomiting.  Incidentally found elevated liver enzymes.  No previous documented elevations.  -alkaline phosphatase: 370  -AST:  197  -ALT:  229  -total bilirubin: 2.7  -ultrasound abdominal limited:  No acute abnormalities, no ductal dilatation  -hepatitis panel:  Nonreactive  -acetaminophen level: less than 3  -Will monitor CMP and consult GI if no improvement      Type 2 diabetes mellitus, without long-term current use of insulin  Adjust regimen for better control.    CLL (chronic lymphocytic leukemia)  Granulomatous Inflammation of the Lung   Patient was diagnosed in 2008.  She is a patient of 's with [facility]. Last appointment was 10/26.  Pt gets a  yearly CT chest to monitor lung nodules present since 2009. Pt take (Imbruvica) Ibrutinib daily.        VTE Risk Mitigation (From admission, onward)         Ordered     enoxaparin injection 40 mg  Daily         11/01/22 1520     IP VTE HIGH RISK PATIENT  Once         11/01/22 1520     Place sequential compression device  Until discontinued         11/01/22 1520                  Miah Burns MD  Department of Hospital Medicine   [facility])

## 2022-11-03 NOTE — PT/OT/SLP PROGRESS
Occupational Therapy   Treatment    Name: Brigitte Sorto  MRN: [id number]  Admitting Diagnosis:  Gram-positive bacteremia       Recommendations:     Discharge Recommendations: home health OT, home health PT  Discharge Equipment Recommendations:  walker, rolling  Barriers to discharge:  Inaccessible home environment (Current functional level)    Assessment:     Brigitte Sorto is a 62 y.o. female with a medical diagnosis of Gram-positive bacteremia.  She presents alert, oriented and in pain with all functional mobility.  Pt attempting to get OOB without assist and educated on need to call for assistance to decrease fall risk. Cues for use of RW to off load weight from LE and positioning self in bed for increased assist with pain management.  Performance deficits affecting function are impaired endurance, impaired self care skills, impaired functional mobility, gait instability, impaired balance, pain, decreased safety awareness.     Rehab Prognosis:  Good; patient would benefit from acute skilled OT services to address these deficits and reach maximum level of function.       Plan:     Patient to be seen 3 x/week to address the above listed problems via self-care/home management, therapeutic activities  Plan of Care Expires: 11/16/22  Plan of Care Reviewed with: patient    Subjective     Pain/Comfort:  Pain Rating 1: 7/10 (At rest)  Location - Side 1: Left  Location - Orientation 1: generalized  Location 1: hip  Pain Addressed 1: Pre-medicate for activity, Reposition, Distraction, Cessation of Activity, Nurse notified  Pain Rating Post-Intervention 1: 10/10 (During movement, standing and ADL mobility.)    Objective:     Communicated with: PTTatiana, prior to session.  Patient found HOB elevated and pt attempting to get out of bed with peripheral IV upon OT entry to room.  Pt stating she needed to go to the bathroom.   Pt crying stating she is hurting.  OT used call button to alert nursing staff about pt's 10/10 pain with  movement.    General Precautions: Standard, fall, diabetic   Orthopedic Precautions:N/A   Braces: N/A  Respiratory Status: Room air     Occupational Performance:     Bed Mobility:    Supine to Sit: SBA  Sit to supine: Min A; assist to lift bilateral LE into bed  Scooting to HOB: Bed in flat position with FOB elevated, pt pulling herself up to HOB with hands gripped on headboard  Education on pt lying in bed with spine and body in alignment.  Pt reporting she has been having right side neck pain and once positioned in alignment, pt reporting her neck pain could be due to lying in bed crooked.  Pt educated on use of bed controls to assist with her staying positioned with head at top of bed by elevating FOB first then elevating HOB.  Pt reporting feeling comfortable at end of tx session.  Pt encouraged to call for assistance when needing to reposition in bed.    Functional Mobility/Transfers:  Sit to stand: SBA with RW, cues for hand placement in preparation for transition  Toilet transfer: SBA with RW; cues off loading pressure from LE by pushing down on RW with bilateral hands for improved pain management  Functional Mobility: No LOB, slow movements due to pain    Activities of Daily Living:  Toileting: SBA with RW; cues to use grab bar on wall next to toilet for safety and ease of transfer  Grooming: SBA standing at sink with RW in front of her      [facility] 6 Click ADL: 19    Treatment & Education:  Role of OT, POC, ADL and ADL mobiity training, safety strategies to reduce fall risk, pain management    Patient left  HOB and FOB elevated  with all lines intact, call button in reach, and pt's nurse, Ame, notified of pt needing pain medication.    GOALS:   Multidisciplinary Problems       Occupational Therapy Goals          Problem: Occupational Therapy    Goal Priority Disciplines Outcome Interventions   Occupational Therapy Goal     OT, PT/OT Ongoing, Progressing    Description: Goals to be met by: 11/16/2022      Patient will increase functional independence with ADLs by performing:    UE Dressing with Modified [place] with clothing retrieval.  LE Dressing with Modified [place] with clothing retrieval.  Grooming while standing at sink for 3 tasks with Modified [place].  Toileting from toilet with Modified [place] for hygiene and clothing management.   Toilet transfer to toilet with Modified [place].                         Time Tracking:     OT Date of Treatment: 11/03/22  OT Start Time: 1305  OT Stop Time: 1319  OT Total Time (min): 14 min    Billable Minutes:Self Care/Home Management 14    OT/[place]: OT          11/3/2022

## 2022-11-03 NOTE — PT/OT/SLP PROGRESS
"Physical Therapy Treatment    Patient Name:  Brigitte Sorto   MRN:  [id number]    Recommendations:     Discharge Recommendations:  home health PT, home health OT   Discharge Equipment Recommendations: walker, rolling   Barriers to discharge: Inaccessible home and Decreased caregiver support    Assessment:     Brigitte Sorto is a 62 y.o. female admitted with a medical diagnosis of Gram-positive bacteremia - admitted d/t BAY and hip pain, MRI results (11/2): "Mild degenerative changes of left hip.  Gluteus minimus and medius tendinosis, likely with partial tears. Mild degenerative changes of right hip.  Gluteus minimus and medius tendinosis".  She presents with the following impairments/functional limitations:  impaired self care skills, impaired functional mobility, gait instability, decreased lower extremity function, decreased safety awareness, pain, orthopedic precautions (awaiting ortho consult for hip pain).    Patient found ambualtory in room without AD. Session focused on reinforcing need for RW to reduce stress/inflammation to hips at this time to decrease pain. Patient with improved tolerance for ambulation this date. Deferred therEx until ortho consult completed/hip pain decreases. Rec for dc to home with HH PT/OT.     Rehab Prognosis: Good; patient would benefit from acute skilled PT services to address these deficits and reach maximum level of function.    Recent Surgery: * No surgery found *      Plan:     During this hospitalization, patient to be seen 3 x/week to address the identified rehab impairments via gait training, therapeutic activities, therapeutic exercises, neuromuscular re-education and progress toward the following goals:    Plan of Care Expires:  12/01/22    Subjective     Chief Complaint: Pain in hip, wanting to eat breakfast  Patient/Family Comments/goals: Goal to stay active, reports she has been taking walks in room on her own; Patient agreeable to PT treatment.  Pain/Comfort:  Pain " Rating 1:  (unrated)  Location - Side 1: Left  Location 1: hip  Pain Addressed 1: Pre-medicate for activity, Reposition, Distraction, Cessation of Activity  Pain Rating Post-Intervention 1:  (unrated, increased with ambulation)      Objective:     Communicated with RN prior to session.  Patient found ambulatory in room/[place] with peripheral IV upon PT entry to room.     General Precautions: Standard, fall, diabetic   Orthopedic Precautions:N/A   Braces: N/A  Respiratory Status: Room air     Patient donned non slip socks and gait belt for OOB mobility.     Functional Mobility:  Transfers:     Sit to Stand:  modified independence and supervision with rolling walker  Gait: x60 ft with RW and CGA-SBA. Maintained increased toe out angle, gait sequencing improved to step thru with equal but shortened step lengths. No overt LOB noted. Cueing for offloading with RW during L stance to reduce pain.       AM-PAC 6 CLICK MOBILITY  Turning over in bed (including adjusting bedclothes, sheets and blankets)?: 3  Sitting down on and standing up from a chair with arms (e.g., wheelchair, bedside commode, etc.): 3  Moving from lying on back to sitting on the side of the bed?: 3  Moving to and from a bed to a chair (including a wheelchair)?: 3  Need to walk in hospital room?: 3  Climbing 3-5 steps with a railing?: 2  Basic Mobility Total Score: 17       Treatment & Education:  Gait as above - reinforced need for RW to decrease pain in hip    Patient left sitting edge of bed with all lines intact, call button in reach, and RN notified..    GOALS:   Multidisciplinary Problems       Physical Therapy Goals          Problem: Physical Therapy    Goal Priority Disciplines Outcome Goal Variances Interventions   Physical Therapy Goal     PT, PT/OT Ongoing, Progressing     Description: Goals to be met by: 22    Patient will increase functional independence with mobility by performin. Sit<>stand with LRAD with SPV.  2. Gait x 100 feet  with LRAD with SBA.  3. Ascend/descend 3 step(s) with least restrictive assistive device and SBA.                           Time Tracking:     PT Received On: 11/03/22  PT Start Time: 0904     PT Stop Time: 0913  PT Total Time (min): 9 min     Billable Minutes: Gait Training 9    Treatment Type: Treatment  PT/PTA: PT     PTA Visit Number: 0     11/03/2022

## 2022-11-03 NOTE — PLAN OF CARE
Problem: Adult Inpatient Plan of Care  Goal: Plan of Care Review  Outcome: Ongoing, Progressing  Goal: Patient-Specific Goal (Individualized)  Outcome: Ongoing, Progressing  Goal: Absence of Hospital-Acquired Illness or Injury  Outcome: Ongoing, Progressing  Goal: Optimal Comfort and Wellbeing  Outcome: Ongoing, Progressing  POC reviewed with pt. A&Ox4. Room air. PRN medication administered for pain. BG monitored. Safety checks performed. Bed in lowest position. Wheels locked. Call light in reach. WCTM.

## 2022-11-03 NOTE — PROGRESS NOTES
Patient reviewed, renal function stable, cultures reviewed, no new levels, continue current therapy; Next levels due: 11/4 @2530

## 2022-11-03 NOTE — HOSPITAL COURSE
Patient is a 62 year-old woman with hypertension, diabetes mellitus type II, chronic lymphocytic leukemia who presented worsening/persistent left hip pain.  Lab studies show elevated inflammatory markers.  Plain film unremarkable. Follow-up MRI  of the left hip negative for fracture, marrow infiltrative process, or abscess and revealed inflammation of gluteus minimus and medius with likely partial tear.  Patient also presents with evidence of acute kidney injury which resolved with volume resuscitation.  Blood cultures positive for Streptococcus pneumoniae.   Source of bacteremia unclear.  Infectious Disease and Orthopedic surgery Services consulted.      Orthopedic surgeon (Dr. Rosalio Ndiaye) suspect inflammation seen on imaging was likely related to osteoarthritis and possible bursitis.  Treatment with nonsteroidal anti-inflammatory drugs and physical therapy advised.  Patient was treated with short course of non-steroidal anti-inflammatory drug which improvement.  Kidney function was closely monitored and remained stable.      Infectious Disease specialist (Dr. Amisha Cantu) was concern for possible early developing left psoas muscle abscess.  CT scan of the psoas muscles obtained and did not show fluid collection however recommendation is to treat for possible developing abscess with a prolonged course of oral levofloxacin 750 mg daily for minimum of 3 weeks which would also cover the bacteremia with Streptococcus pneumoniae.  Source of Streptococcus min remains unclear.  Echocardiogram 10 did not show evidence of endocarditis.  Risks of fluoroquinolone therapy discussed with patient.    Patient discharged home stable condition on oral fluoroquinolone therapy and physical therapy with outpatient follow-up with Infectious Disease [facility].  Patient also advised to follow-up with orthopedic surgery and with her primary care physician ongoing management of medical comorbidities.

## 2022-11-03 NOTE — ASSESSMENT & PLAN NOTE
Patient with elevated white count, inflammatory markers including elevated procalcitonin.  Patient with 2/2 positive blood cultures with gram positive cocci.  Improving with antibiotics.  Continue antibiotic therapy with increased dose of ceftriaxone.  Source unclear.  Despite chest radiograph findings patient's clinical picture not consistent with pneumonia.  Concern for hip infection however MRI not clear for infection.  Consult ID.

## 2022-11-03 NOTE — HPI
"62F with h/o leukemia, t2dm, htn admitted 11/1 with acute on chronic L hip pain. describes the pain as sharp, intermittent, and worse with weight-bearing movement.  It radiates around her but to the front of her pelvic area.  It does come and go.  She has had some pain free days.  Patient has been taking about 4 Tylenol a day for the past month and has tried a lidocaine patch.  She has visited the ED multiple times for the same complaint.   Patient denies any trauma, lower abdominal pain, back or neck pain. Says she used to have a port for chemo, but it was attempted to be removed because she wasn't using, but says it was not able to be removed in entirety       Bcx x 2 with  Blood Culture, Routine  Abnormal   STREPTOCOCCUS PNEUMONIAE   For susceptibility see order #[id number]         Repeat bcx x1 sent today    Mri hip   1. No fracture or marrow infiltrative process.  2. Mild degenerative changes of left hip.  Gluteus minimus and medius tendinosis, likely with partial tears.  3. Mild degenerative changes of right hip.  Gluteus minimus and medius tendinosis.      No echo done    On vanc/ceftriaxone    ID consulted for "Gram positive bactermeia, source unclear"  "

## 2022-11-03 NOTE — PLAN OF CARE
Patient seen, no signs of hip infection today    Likely trochanteric bursitis in addition to gluteal tendinosis, recommended trial of anti-inflammatory, Dr. Burns will order this    I will follow up with patient tomorrow, if no improvement, can try bursal cortisone injection    Full consult note to follow    Electronically signed by:  William Juárez PA-C

## 2022-11-03 NOTE — ASSESSMENT & PLAN NOTE
Granulomatous Inflammation of the Lung   Patient was diagnosed in 2008.  She is a patient of 's with [facility]. Last appointment was 10/26.  Pt gets a yearly CT chest to monitor lung nodules present since 2009. Pt take (Imbruvica) Ibrutinib daily.

## 2022-11-03 NOTE — SUBJECTIVE & OBJECTIVE
Interval History: Patient reports left hip pain severe and interfering with ambulation.    Review of Systems   Constitutional:  Negative for chills and fever.   Respiratory:  Negative for shortness of breath.    Cardiovascular:  Negative for chest pain.   Gastrointestinal:  Negative for abdominal pain, nausea and vomiting.   Genitourinary:  Negative for dysuria and frequency.   Musculoskeletal:  Positive for arthralgias.     Objective:     Vital Signs (Most Recent):  Temp: 99.5 °F (37.5 °C) (11/02/22 2023)  Pulse: 98 (11/02/22 2023)  Resp: 15 (11/02/22 2026)  BP: (!) 123/58 (11/02/22 2023)  SpO2: 96 % (11/02/22 2023)   Vital Signs (24h Range):  Temp:  [97.9 °F (36.6 °C)-99.6 °F (37.6 °C)] 99.5 °F (37.5 °C)  Pulse:  [85-98] 98  Resp:  [14-18] 15  SpO2:  [93 %-97 %] 96 %  BP: (114-133)/(53-64) 123/58     Weight: 78 kg (172 lb)  Body mass index is 28.62 kg/m².    Intake/Output Summary (Last 24 hours) at 11/2/2022 2211  Last data filed at 11/2/2022 1934  Gross per 24 hour   Intake 1680 ml   Output --   Net 1680 ml      Physical Exam  Constitutional:       General: She is not in acute distress.  HENT:      Head: Atraumatic.   Eyes:      Conjunctiva/sclera: Conjunctivae normal.   Cardiovascular:      Rate and Rhythm: Normal rate and regular rhythm.      Heart sounds: Normal heart sounds. No murmur heard.  Pulmonary:      Effort: Pulmonary effort is normal.      Breath sounds: Normal breath sounds. No wheezing.   Abdominal:      General: Bowel sounds are normal. There is no distension.      Palpations: Abdomen is soft.      Tenderness: There is no abdominal tenderness.   Musculoskeletal:         General: Tenderness present. No deformity. Normal range of motion.      Cervical back: Neck supple.      Comments: Left hip tender from left buttocks which radiates to pelvis.   Neurological:      Mental Status: She is alert and oriented to person, place, and time.       Significant Labs: All pertinent labs within the past 24 hours  have been reviewed.    Significant Imaging: I have reviewed all pertinent imaging results/findings within the past 24 hours.

## 2022-11-03 NOTE — ASSESSMENT & PLAN NOTE
No history of trauma.  Plain film unremarkable. Non-specific inflammatory markers elevated. Follow-up MRI concerning for inflammation of gluteus minimus and tendinosis with likely partial tear but without fracture, infiltrative process.  Continue therapy and pain control.  Consult orthopedic surgery for evaluation and further treatment recommendations.

## 2022-11-04 PROBLEM — E87.6 HYPOKALEMIA: Status: ACTIVE | Noted: 2022-11-04

## 2022-11-04 LAB
ANION GAP SERPL CALC-SCNC: 9 MMOL/L (ref 8–16)
ASCENDING AORTA: 2.26 CM
AV INDEX (PROSTH): 0.73
AV MEAN GRADIENT: 5 MMHG
AV PEAK GRADIENT: 9 MMHG
AV VALVE AREA: 1.8 CM2
AV VELOCITY RATIO: 0.88
BACTERIA BLD CULT: ABNORMAL
BASOPHILS # BLD AUTO: 0.07 K/UL (ref 0–0.2)
BASOPHILS NFR BLD: 0.5 % (ref 0–1.9)
BSA FOR ECHO PROCEDURE: 1.89 M2
BUN SERPL-MCNC: 9 MG/DL (ref 8–23)
CALCIUM SERPL-MCNC: 9.8 MG/DL (ref 8.7–10.5)
CHLORIDE SERPL-SCNC: 100 MMOL/L (ref 95–110)
CO2 SERPL-SCNC: 27 MMOL/L (ref 23–29)
CREAT SERPL-MCNC: 0.7 MG/DL (ref 0.5–1.4)
CV ECHO LV RWT: 0.43 CM
DIFFERENTIAL METHOD: ABNORMAL
DOP CALC AO PEAK VEL: 1.46 M/S
DOP CALC AO VTI: 29.5 CM
DOP CALC LVOT AREA: 2.5 CM2
DOP CALC LVOT DIAMETER: 1.77 CM
DOP CALC LVOT PEAK VEL: 1.28 M/S
DOP CALC LVOT STROKE VOLUME: 53.12 CM3
DOP CALC MV VTI: 21.6 CM
DOP CALC RVOT PEAK VEL: 0.62 M/S
DOP CALC RVOT VTI: 13.8 CM
DOP CALCLVOT PEAK VEL VTI: 21.6 CM
E WAVE DECELERATION TIME: 162.76 MSEC
E/A RATIO: 0.85
E/E' RATIO: 8.78 M/S
ECHO LV POSTERIOR WALL: 0.91 CM (ref 0.6–1.1)
EJECTION FRACTION: 65 %
EOSINOPHIL # BLD AUTO: 0.1 K/UL (ref 0–0.5)
EOSINOPHIL NFR BLD: 0.7 % (ref 0–8)
ERYTHROCYTE [DISTWIDTH] IN BLOOD BY AUTOMATED COUNT: 14.3 % (ref 11.5–14.5)
EST. GFR  (NO RACE VARIABLE): >60 ML/MIN/1.73 M^2
FRACTIONAL SHORTENING: 43 % (ref 28–44)
GLUCOSE SERPL-MCNC: 125 MG/DL (ref 70–110)
HCT VFR BLD AUTO: 29 % (ref 37–48.5)
HGB BLD-MCNC: 9.4 G/DL (ref 12–16)
IMM GRANULOCYTES # BLD AUTO: 0.1 K/UL (ref 0–0.04)
IMM GRANULOCYTES NFR BLD AUTO: 0.7 % (ref 0–0.5)
INTERVENTRICULAR SEPTUM: 0.84 CM (ref 0.6–1.1)
IVC DIAMETER: 1.73 CM
IVRT: 64.7 MSEC
LA MAJOR: 5.22 CM
LA MINOR: 5.02 CM
LA WIDTH: 3.5 CM
LEFT ATRIUM SIZE: 3.58 CM
LEFT ATRIUM VOLUME INDEX MOD: 29.6 ML/M2
LEFT ATRIUM VOLUME INDEX: 29.3 ML/M2
LEFT ATRIUM VOLUME MOD: 55 CM3
LEFT ATRIUM VOLUME: 54.51 CM3
LEFT INTERNAL DIMENSION IN SYSTOLE: 2.44 CM (ref 2.1–4)
LEFT VENTRICLE DIASTOLIC VOLUME INDEX: 44.01 ML/M2
LEFT VENTRICLE DIASTOLIC VOLUME: 81.86 ML
LEFT VENTRICLE MASS INDEX: 63 G/M2
LEFT VENTRICLE SYSTOLIC VOLUME INDEX: 13.1 ML/M2
LEFT VENTRICLE SYSTOLIC VOLUME: 24.4 ML
LEFT VENTRICULAR INTERNAL DIMENSION IN DIASTOLE: 4.27 CM (ref 3.5–6)
LEFT VENTRICULAR MASS: 117.34 G
LV LATERAL E/E' RATIO: 7.18 M/S
LV SEPTAL E/E' RATIO: 11.29 M/S
LVOT MG: 2.75 MMHG
LVOT MV: 0.75 CM/S
LYMPHOCYTES # BLD AUTO: 2.2 K/UL (ref 1–4.8)
LYMPHOCYTES NFR BLD: 16 % (ref 18–48)
MAGNESIUM SERPL-MCNC: 1.7 MG/DL (ref 1.6–2.6)
MCH RBC QN AUTO: 23.7 PG (ref 27–31)
MCHC RBC AUTO-ENTMCNC: 32.4 G/DL (ref 32–36)
MCV RBC AUTO: 73 FL (ref 82–98)
MONOCYTES # BLD AUTO: 1.2 K/UL (ref 0.3–1)
MONOCYTES NFR BLD: 8.9 % (ref 4–15)
MV MEAN GRADIENT: 2 MMHG
MV PEAK A VEL: 0.93 M/S
MV PEAK E VEL: 0.79 M/S
MV PEAK GRADIENT: 4 MMHG
MV STENOSIS PRESSURE HALF TIME: 47.2 MS
MV VALVE AREA BY CONTINUITY EQUATION: 2.46 CM2
MV VALVE AREA P 1/2 METHOD: 4.66 CM2
NEUTROPHILS # BLD AUTO: 9.9 K/UL (ref 1.8–7.7)
NEUTROPHILS NFR BLD: 73.2 % (ref 38–73)
NRBC BLD-RTO: 0 /100 WBC
PHOSPHATE SERPL-MCNC: 3.4 MG/DL (ref 2.7–4.5)
PISA MRMAX VEL: 3.84 M/S
PISA TR MAX VEL: 3.03 M/S
PLATELET # BLD AUTO: 202 K/UL (ref 150–450)
PMV BLD AUTO: 12.5 FL (ref 9.2–12.9)
POCT GLUCOSE: 114 MG/DL (ref 70–110)
POCT GLUCOSE: 130 MG/DL (ref 70–110)
POCT GLUCOSE: 130 MG/DL (ref 70–110)
POCT GLUCOSE: 140 MG/DL (ref 70–110)
POTASSIUM SERPL-SCNC: 3 MMOL/L (ref 3.5–5.1)
PULM VEIN S/D RATIO: 1.4
PV MEAN GRADIENT: 1.01 MMHG
PV MV: 0.63 M/S
PV PEAK D VEL: 0.47 M/S
PV PEAK S VEL: 0.66 M/S
PV PEAK VELOCITY: 0.88 CM/S
RA MAJOR: 4.85 CM
RA PRESSURE: 3 MMHG
RA WIDTH: 3.8 CM
RBC # BLD AUTO: 3.96 M/UL (ref 4–5.4)
SINUS: 3.1 CM
SODIUM SERPL-SCNC: 136 MMOL/L (ref 136–145)
STJ: 1.8 CM
TDI LATERAL: 0.11 M/S
TDI SEPTAL: 0.07 M/S
TDI: 0.09 M/S
TR MAX PG: 37 MMHG
TRICUSPID ANNULAR PLANE SYSTOLIC EXCURSION: 2.2 CM
TV REST PULMONARY ARTERY PRESSURE: 40 MMHG
VANCOMYCIN TROUGH SERPL-MCNC: 10 UG/ML (ref 10–22)
WBC # BLD AUTO: 13.56 K/UL (ref 3.9–12.7)

## 2022-11-04 PROCEDURE — 63600175 PHARM REV CODE 636 W HCPCS: Performed by: NURSE PRACTITIONER

## 2022-11-04 PROCEDURE — 11000001 HC ACUTE MED/SURG PRIVATE ROOM

## 2022-11-04 PROCEDURE — 99233 PR SUBSEQUENT HOSPITAL CARE,LEVL III: ICD-10-PCS | Mod: ,,, | Performed by: HOSPITALIST

## 2022-11-04 PROCEDURE — 25000003 PHARM REV CODE 250: Performed by: HOSPITALIST

## 2022-11-04 PROCEDURE — 97535 SELF CARE MNGMENT TRAINING: CPT

## 2022-11-04 PROCEDURE — 80048 BASIC METABOLIC PNL TOTAL CA: CPT | Performed by: HOSPITALIST

## 2022-11-04 PROCEDURE — 99233 SBSQ HOSP IP/OBS HIGH 50: CPT | Mod: ,,, | Performed by: HOSPITALIST

## 2022-11-04 PROCEDURE — 97110 THERAPEUTIC EXERCISES: CPT | Mod: CQ

## 2022-11-04 PROCEDURE — 63600175 PHARM REV CODE 636 W HCPCS: Performed by: HOSPITALIST

## 2022-11-04 PROCEDURE — 97116 GAIT TRAINING THERAPY: CPT | Mod: CQ

## 2022-11-04 PROCEDURE — 80202 ASSAY OF VANCOMYCIN: CPT | Performed by: HOSPITALIST

## 2022-11-04 PROCEDURE — 25000003 PHARM REV CODE 250: Performed by: NURSE PRACTITIONER

## 2022-11-04 PROCEDURE — 25000003 PHARM REV CODE 250: Performed by: PHYSICIAN ASSISTANT

## 2022-11-04 PROCEDURE — 36415 COLL VENOUS BLD VENIPUNCTURE: CPT | Performed by: HOSPITALIST

## 2022-11-04 PROCEDURE — 85025 COMPLETE CBC W/AUTO DIFF WBC: CPT | Performed by: HOSPITALIST

## 2022-11-04 PROCEDURE — 83735 ASSAY OF MAGNESIUM: CPT | Performed by: HOSPITALIST

## 2022-11-04 PROCEDURE — 84100 ASSAY OF PHOSPHORUS: CPT | Performed by: HOSPITALIST

## 2022-11-04 PROCEDURE — 25500020 PHARM REV CODE 255: Performed by: HOSPITALIST

## 2022-11-04 RX ADMIN — GABAPENTIN 200 MG: 100 CAPSULE ORAL at 08:11

## 2022-11-04 RX ADMIN — ENOXAPARIN SODIUM 40 MG: 100 INJECTION SUBCUTANEOUS at 09:11

## 2022-11-04 RX ADMIN — THERA TABS 1 TABLET: TAB at 09:11

## 2022-11-04 RX ADMIN — TIMOLOL MALEATE 1 DROP: 2.5 SOLUTION/ DROPS OPHTHALMIC at 08:11

## 2022-11-04 RX ADMIN — ACETAMINOPHEN 1000 MG: 500 TABLET ORAL at 10:11

## 2022-11-04 RX ADMIN — CELECOXIB 100 MG: 100 CAPSULE ORAL at 09:11

## 2022-11-04 RX ADMIN — POTASSIUM BICARBONATE 40 MEQ: 391 TABLET, EFFERVESCENT ORAL at 12:11

## 2022-11-04 RX ADMIN — OXYCODONE 5 MG: 5 TABLET ORAL at 06:11

## 2022-11-04 RX ADMIN — LIDOCAINE 1 PATCH: 50 PATCH CUTANEOUS at 09:11

## 2022-11-04 RX ADMIN — CELECOXIB 100 MG: 100 CAPSULE ORAL at 08:11

## 2022-11-04 RX ADMIN — TIMOLOL MALEATE 1 DROP: 2.5 SOLUTION/ DROPS OPHTHALMIC at 09:11

## 2022-11-04 RX ADMIN — IOHEXOL 100 ML: 350 INJECTION, SOLUTION INTRAVENOUS at 09:11

## 2022-11-04 RX ADMIN — ACETAMINOPHEN 1000 MG: 500 TABLET ORAL at 06:11

## 2022-11-04 RX ADMIN — CEFTRIAXONE 2 G: 2 INJECTION, SOLUTION INTRAVENOUS at 08:11

## 2022-11-04 RX ADMIN — VANCOMYCIN HYDROCHLORIDE 1000 MG: 1 INJECTION, POWDER, LYOPHILIZED, FOR SOLUTION INTRAVENOUS at 02:11

## 2022-11-04 RX ADMIN — GABAPENTIN 200 MG: 100 CAPSULE ORAL at 09:11

## 2022-11-04 NOTE — ASSESSMENT & PLAN NOTE
No history of trauma.  Plain film unremarkable. Non-specific inflammatory markers elevated. Follow-up MRI concerning for inflammation of gluteus minimus and tendinosis with likely partial tear but without fracture, infiltrative process.  Continue therapy and pain control.  Consulted orthopedic surgery who do not feel hip is source of infection and recommending NSAIDs, therapy, and possible bursa injection if no improvement.  Acute kidney injury resolved.  Short course of NSAIDs recommended.  Kidney function stable.  Continue to closely monitor kidney function.

## 2022-11-04 NOTE — ASSESSMENT & PLAN NOTE
Patient with elevated white count, inflammatory markers including elevated procalcitonin.  Patient with 2/2 positive blood cultures with gram positive cocci.  Improving with antibiotics.  Continue antibiotic therapy with increased dose of ceftriaxone.  Source unclear.  Despite chest radiograph findings patient's clinical picture not consistent with pneumonia.  Concern for hip infection however MRI not clear for infection.  Consulted orthopedic surgery who do not feel there is evidence of hip infection.  ID to evaluate.

## 2022-11-04 NOTE — PROGRESS NOTES
"[facility] Medicine  Progress Note    Patient Name: Brigitte Sorto  MRN: [id number]  Patient Class: IP- Inpatient   Admission Date: 11/1/2022  Length of Stay: 3 days  Attending Physician: Miah Burns MD  Primary Care Provider: Ace Kyle MD        Subjective:     Principal Problem:Gram-positive bacteremia        HPI:  Per Rosario Prado, DNP:    "Ms. Briggs is 62-year-old female with a past medical history of chronic lymphocyte leukemia, diabetes type 2, and hypertension.  She came to the emergency department for left hip pain that began 1 month ago.  Patient describes the pain as sharp, intermittent, and worse with weight-bearing movement.  It radiates around her but to the front of her pelvic area.  It does come and go.  She has had some pain free days.  Patient has been taking about 4 Tylenol a day for the past month and has tried a lidocaine patch.  She has visited the ED multiple times for the same complaint.  Hip radiographs have been unrevealing.  Patient denies any trauma, lower abdominal pain, back or neck pain. While in the ED, patient was hypotensive.  Her blood work found elevated white blood cell count over 17 along with the Procalcitonin of 8.  Patient also had an acute kidney injury with a creatinine of 1.7.  She had elevated liver enzymes as well.  Sepsis protocol was initiated.  Patient admitted to hospital medicine for further management."      Overview/Hospital Course:  Patient is a 62 year-old woman with hypertension, diabetes mellitus type II, chronic lymphocyte leukemia who presented worsening/persistent left hip pain.  Lab studies show elevated inflammatory markers.  Plain film unremarkable. Follow-up MRI  of the left hip negative for fracture, marrow infiltrative process, or abscess and revealed inflammation of gluteus minimus and medius with likely partial tear.  Patient also presents with evidence of acute kidney injury which is improving with " volume resuscitation.  Blood cultures positive for Streptococcus pneumoniae.   Source of bacteremia unclear.  ID consulted.      Interval History: No acute events overnight.  Hip pain mildly improved.    Review of Systems   Constitutional:  Negative for chills and fever.   Respiratory:  Negative for shortness of breath.    Cardiovascular:  Negative for chest pain.   Gastrointestinal:  Negative for abdominal pain, nausea and vomiting.   Genitourinary:  Negative for dysuria and frequency.   Musculoskeletal:  Positive for arthralgias.     Objective:     Vital Signs (Most Recent):  Temp: 98.7 °F (37.1 °C) (11/04/22 1204)  Pulse: 86 (11/04/22 1204)  Resp: 18 (11/04/22 1204)  BP: (!) 111/58 (11/04/22 1204)  SpO2: 95 % (11/04/22 1204)   Vital Signs (24h Range):  Temp:  [98 °F (36.7 °C)-99.7 °F (37.6 °C)] 98.7 °F (37.1 °C)  Pulse:  [86-99] 86  Resp:  [14-18] 18  SpO2:  [94 %-96 %] 95 %  BP: (111-160)/(58-75) 111/58     Weight: 78 kg (172 lb)  Body mass index is 28.62 kg/m².  No intake or output data in the 24 hours ending 11/04/22 1521     Physical Exam  Constitutional:       General: She is not in acute distress.  HENT:      Head: Atraumatic.   Eyes:      Conjunctiva/sclera: Conjunctivae normal.   Cardiovascular:      Rate and Rhythm: Normal rate and regular rhythm.      Heart sounds: Normal heart sounds. No murmur heard.  Pulmonary:      Effort: Pulmonary effort is normal.      Breath sounds: Normal breath sounds. No wheezing.   Abdominal:      General: Bowel sounds are normal. There is no distension.      Palpations: Abdomen is soft.      Tenderness: There is no abdominal tenderness.   Musculoskeletal:         General: Tenderness present. No deformity. Normal range of motion.      Cervical back: Neck supple.      Comments: Left hip tender from left buttocks which radiates to pelvis.   Neurological:      Mental Status: She is alert and oriented to person, place, and time.       Significant Labs: All pertinent labs within  the past 24 hours have been reviewed.    Significant Imaging: I have reviewed all pertinent imaging results/findings within the past 24 hours.        Assessment/Plan:      * Gram-positive bacteremia  Patient with elevated white count, inflammatory markers including elevated procalcitonin.  Patient with 2/2 positive blood cultures with gram positive cocci.  Improving with antibiotics.  Continue antibiotic therapy with increased dose of ceftriaxone.  Source unclear.  Despite chest radiograph findings patient's clinical picture not consistent with pneumonia.  Concern for hip infection however MRI not clear for infection.  Consulted orthopedic surgery who do not feel there is evidence of hip infection.  ID to evaluate and recommend further imaging to evaluate for possible psoas abscess.  Discussed radiology recommending CT abdomen pelvis with intravenous contrast as the best modality to evaluate further.  Patient had some renal insufficiency on admission however serum creatinine back to baseline.    Hip pain, acute, left  No history of trauma.  Plain film unremarkable. Non-specific inflammatory markers elevated. Follow-up MRI concerning for inflammation of gluteus minimus and tendinosis with likely partial tear but without fracture, infiltrative process.  Continue therapy and pain control.  Consulted orthopedic surgery who do not feel hip is source of infection and recommending NSAIDs, therapy, and possible bursa injection if no improvement.  Acute kidney injury resolved.  Short course of NSAIDs recommended.  Kidney function stable.  Continue to closely monitor kidney function.    Hypertension  Normotensive.    Hypokalemia  Replace.      Transaminitis  Patient denies abdominal pain, nausea, vomiting.  Incidentally found elevated liver enzymes.  No previous documented elevations.  -alkaline phosphatase: 370  -AST:  197  -ALT:  229  -total bilirubin: 2.7  -ultrasound abdominal limited:  No acute abnormalities, no ductal  dilatation  -hepatitis panel:  Nonreactive  -acetaminophen level: less than 3  -Will monitor CMP and consult GI if no improvement      Type 2 diabetes mellitus, without long-term current use of insulin  Adjust regimen for better control.    CLL (chronic lymphocytic leukemia)  Granulomatous Inflammation of the Lung   Patient was diagnosed in 2008.  She is a patient of 's with [facility]. Last appointment was 10/26.  Pt gets a yearly CT chest to monitor lung nodules present since 2009. Pt take (Imbruvica) Ibrutinib daily.        VTE Risk Mitigation (From admission, onward)         Ordered     enoxaparin injection 40 mg  Daily         11/01/22 1520     IP VTE HIGH RISK PATIENT  Once         11/01/22 1520     Place sequential compression device  Until discontinued         11/01/22 1520                Miah Burns MD  Department of Hospital Medicine   [facility] - Med S[facility])

## 2022-11-04 NOTE — ASSESSMENT & PLAN NOTE
No history of trauma.  Plain film unremarkable. Non-specific inflammatory markers elevated. Follow-up MRI concerning for inflammation of gluteus minimus and tendinosis with likely partial tear but without fracture, infiltrative process.  Continue therapy and pain control.  Consulted orthopedic surgery who do not feel hip is source of infection and recommending NSAIDs, therapy, and possible bursa injection if no improvement.  Acute kidney injury.  Will give short course of NSAID and closely monitor kidney function.

## 2022-11-04 NOTE — PROGRESS NOTES
Pharmacokinetic Assessment Follow Up: IV Vancomycin    Vancomycin serum concentration assessment(s):    The trough level was drawn correctly and can be used to guide therapy at this time. The measurement is within the desired definitive target range of 10 to 20 mcg/mL.    Vancomycin Regimen Plan:    Change regimen to Vancomycin 1250  mg IV every 12 hours with next serum trough concentration measured at 1330 prior to 1400 dose on 11/6    Drug levels (last 3 results):  Recent Labs   Lab Result Units 11/02/22  0645 11/04/22  1058   Vancomycin, Random ug/mL 5.7  --    Vancomycin-Trough ug/mL  --  10.0       Pharmacy will continue to follow and monitor vancomycin.    Please contact pharmacy at extension [phone] for questions regarding this assessment.    Thank you for the consult,   Ruthann Allen       Patient brief summary:  Brigitte Sorto is a 62 y.o. female initiated on antimicrobial therapy with IV Vancomycin for treatment of urinary tract infection    The patient's current regimen is Vancomycin 1250mg IVPB every 12 hours    Drug Allergies:   Review of patient's allergies indicates:   Allergen Reactions    Amoxicillin        Actual Body Weight:   78kg    Renal Function:   Estimated Creatinine Clearance: 83.8 mL/min (based on SCr of 0.7 mg/dL).,     Dialysis Method (if applicable):  N/A    CBC (last 72 hours):  Recent Labs   Lab Result Units 11/02/22  0645 11/03/22  0535 11/04/22  0600   WBC K/uL 13.75* 14.57* 13.56*   Hemoglobin g/dL 9.7* 9.5* 9.4*   Hemoglobin A1C % 6.1*  --   --    Hematocrit % 30.4* 29.8* 29.0*   Platelets K/uL 142* 159 202   Gran % % 79.9* 71.0 73.2*   Lymph % % 9.6* 19.1 16.0*   Mono % % 9.2 8.7 8.9   Eosinophil % % 0.2 0.5 0.7   Basophil % % 0.2 0.2 0.5   Differential Method  Automated Automated Automated       Metabolic Panel (last 72 hours):  Recent Labs   Lab Result Units 11/01/22  1334 11/02/22  0645 11/03/22  0535 11/04/22  0600   Sodium mmol/L  --  139 136 136   Potassium mmol/L  --  3.2*  5.0 3.0*   Chloride mmol/L  --  110 105 100   CO2 mmol/L  --  24 19* 27   Glucose mg/dL  --  130* 77 125*   Glucose, UA  Negative  --   --   --    BUN mg/dL  --  18 13 9   Creatinine mg/dL  --  0.7 0.7 0.7   Albumin g/dL  --  2.4*  --   --    Total Bilirubin mg/dL  --  1.1*  --   --    Alkaline Phosphatase U/L  --  345*  --   --    AST U/L  --  112*  --   --    ALT U/L  --  159*  --   --    Magnesium mg/dL  --  1.6 1.8 1.7   Phosphorus mg/dL  --  2.3* 1.9* 3.4       Vancomycin Administrations:  vancomycin given in the last 96 hours                     vancomycin in dextrose 5 % 1 gram/250 mL IVPB 1,000 mg (mg) 1,000 mg New Bag 11/04/22 0202     1,000 mg New Bag 11/03/22 1331     1,000 mg New Bag  0108     1,000 mg New Bag 11/02/22 1028    vancomycin 1.5 g in dextrose 5 % 250 mL IVPB (ready to mix) (mg) 1,500 mg New Bag 11/01/22 1332                    Microbiologic Results:  Microbiology Results (last 7 days)       Procedure Component Value Units Date/Time    Blood culture x two cultures. Draw prior to antibiotics. [[id number]]  (Abnormal) Collected: 11/01/22 1035    Order Status: Completed Specimen: Blood Updated: 11/04/22 1145     Blood Culture, Routine Gram stain aer bottle: Gram positive cocci in chains resembling Strep      Gram stain mary jane bottle: Gram positive cocci in chains resembling Strep      Results called to and read back by: Joe Alegre RN 11/02/2022  05:00      STREPTOCOCCUS PNEUMONIAE  For susceptibility see order #[id number]      Narrative:      Aerobic and anaerobic    Blood culture x two cultures. Draw prior to antibiotics. [[id number]]  (Abnormal)  (Susceptibility) Collected: 11/01/22 1035    Order Status: Completed Specimen: Blood Updated: 11/04/22 1144     Blood Culture, Routine Gram stain aer bottle: Gram positive cocci in chains resembling Strep      Gram stain mary jane bottle: Gram positive cocci in chains resembling Strep      Results called to and read back by: Joe Alegre RN 11/02/2022   05:00      STREPTOCOCCUS PNEUMONIAE    Narrative:      Aerobic and anaerobic    Blood culture [[id number]] Collected: 11/03/22 0007    Order Status: Completed Specimen: Blood Updated: 11/04/22 1022     Blood Culture, Routine No Growth to date      No Growth to date    Urine culture [[id number]] Collected: 11/01/22 1334    Order Status: Completed Specimen: Urine Updated: 11/02/22 2317     Urine Culture, Routine No significant growth    Narrative:      Specimen Source->Urine    Influenza A & B by Molecular [[id number]]     Order Status: Canceled Specimen: Nasopharyngeal Swab

## 2022-11-04 NOTE — CONSULTS
"[facility] ([facility])  Infectious Disease  Consult Note    Patient Name: Brigitte Sorto  MRN: [id number]  Admission Date: 11/1/2022  Hospital Length of Stay: 3 days  Attending Physician: Miah Burns MD  Primary Care Provider: Ace Kyle MD     Isolation Status: No active isolations    Patient information was obtained from patient and ER records.      Inpatient consult to Infectious Diseases  Consult performed by: Amisha Cantu MD  Consult ordered by: Miah Burns MD        Assessment/Plan:     * Gram-positive bacteremia  62F with h/o leukemia, t2dm, htn admitted 11/1 with acute on chronic L hip pain.Bcx x 2 with strep pneumo. Repeat pending. Mri hip - partial tear gluteus.No echo done. On vanc/ceftriaxone. ID consulted for "Gram positive bactermeia, source unclear"    Unclear source of bacteremia (sinusitis? Cellulitis?), agree with ruling out septic arthritis/OM/abscess in L hip area. I do worry about retained components of prior port, which can be seeded from bacteremia    Recommendations:   - agree with ortho eval to r/o septic L hip  - please ensure she's had adequate imaging to r/o L psoas abscess  - de-escalate to ceftriaxone. On d/c anticipating de-escalating to po levaquin, so would avoid placing picc line (I confirmed with micro that isolate is susceptible)  - repeat bcx to document clearance  - f/u 2d echo  - consider ultrasound of prior port area to eval for fluid collection/signs of infection at retained leads.           Discussed with hospitalist    Thank you for your consult. I will follow-up with patient. Please contact us if you have any additional questions.    Amisha Cantu MD  Infectious Disease  [facility] ([facility])    Subjective:     Principal Problem: Gram-positive bacteremia    HPI:   62F with h/o leukemia, t2dm, htn admitted 11/1 with acute on chronic L hip pain. describes the pain as sharp, intermittent, and worse with weight-bearing movement.  It " "radiates around her but to the front of her pelvic area.  It does come and go.  She has had some pain free days.  Patient has been taking about 4 Tylenol a day for the past month and has tried a lidocaine patch.  She has visited the ED multiple times for the same complaint.   Patient denies any trauma, lower abdominal pain, back or neck pain. Says she used to have a port for chemo, but it was attempted to be removed because she wasn't using, but says it was not able to be removed in entirety       Bcx x 2 with  Blood Culture, Routine  Abnormal   STREPTOCOCCUS PNEUMONIAE   For susceptibility see order #[id number]         Repeat bcx x1 sent today    Mri hip   1. No fracture or marrow infiltrative process.  2. Mild degenerative changes of left hip.  Gluteus minimus and medius tendinosis, likely with partial tears.  3. Mild degenerative changes of right hip.  Gluteus minimus and medius tendinosis.      No echo done    On vanc/ceftriaxone    ID consulted for "Gram positive bactermeia, source unclear"      Past Medical History:   Diagnosis Date    Cancer 5yrs ago    leukemia     CLL (chronic lymphocytic leukemia)     Diabetes mellitus        History reviewed. No pertinent surgical history.    Review of patient's allergies indicates:   Allergen Reactions    Amoxicillin        No current facility-administered medications on file prior to encounter.     Current Outpatient Medications on File Prior to Encounter   Medication Sig    amLODIPine (NORVASC) 5 MG tablet Take 5 mg by mouth once daily.    chlorthalidone (HYGROTEN) 25 MG Tab Take 12.5 mg by mouth once daily.    IMBRUVICA 420 mg Tab Take 1 tablet by mouth once daily.    losartan (COZAAR) 100 MG tablet Take 100 mg by mouth once daily.    multivitamin (THERAGRAN) per tablet Take 1 tablet by mouth once daily.    timolol maleate 0.5% (TIMOPTIC-XE) 0.5 % SolG Place 1 drop into both eyes 2 (two) times daily.     Family History       Problem Relation (Age of Onset) "    Breast cancer Sister    Cancer Mother, Sister    Cirrhosis Father    Diabetes Brother    Hypertension Sister    Lung cancer Sister    Throat cancer Mother          Tobacco Use    Smoking status: Never    Smokeless tobacco: Never   Substance and Sexual Activity    Alcohol use: No    Drug use: No    Sexual activity: Not on file     Review of Systems   Constitutional:  Negative for activity change, chills and fever.   HENT:  Negative for congestion and trouble swallowing.    Eyes:  Negative for photophobia and visual disturbance.   Respiratory:  Negative for cough, shortness of breath and wheezing.    Cardiovascular:  Negative for chest pain, palpitations and leg swelling.   Gastrointestinal:  Negative for abdominal pain, diarrhea, nausea and vomiting.        BM yesterday   Endocrine: Negative for polydipsia and polyuria.   Genitourinary:  Negative for dysuria and frequency.   Musculoskeletal:  Positive for arthralgias (Left hip, intermittent, with weight-bearing activities). Negative for gait problem.   Skin:  Negative for rash and wound.   Neurological:  Positive for weakness. Negative for dizziness, speech difficulty and headaches.   Psychiatric/Behavioral:  Negative for confusion and sleep disturbance.    Objective:     Vital Signs (Most Recent):  Temp: 98.7 °F (37.1 °C) (11/04/22 1204)  Pulse: 86 (11/04/22 1204)  Resp: 18 (11/04/22 1204)  BP: (!) 111/58 (11/04/22 1204)  SpO2: 95 % (11/04/22 1204)   Vital Signs (24h Range):  Temp:  [98 °F (36.7 °C)-99.7 °F (37.6 °C)] 98.7 °F (37.1 °C)  Pulse:  [86-99] 86  Resp:  [14-18] 18  SpO2:  [94 %-96 %] 95 %  BP: (111-160)/(58-75) 111/58     Weight: 78 kg (172 lb)  Body mass index is 28.62 kg/m².    Physical Exam  Vitals reviewed.   Constitutional:       General: She is not in acute distress.     Appearance: She is not ill-appearing.   HENT:      Head: Normocephalic and atraumatic.      Nose: No congestion.      Mouth/Throat:      Mouth: Mucous membranes are moist.       Pharynx: Oropharynx is clear.   Eyes:      General:         Right eye: No discharge.         Left eye: No discharge.      Extraocular Movements: Extraocular movements intact.      Pupils: Pupils are equal, round, and reactive to light.   Cardiovascular:      Rate and Rhythm: Normal rate and regular rhythm.      Heart sounds: Normal heart sounds. No murmur heard.  Pulmonary:      Effort: Pulmonary effort is normal. No respiratory distress.      Breath sounds: Normal breath sounds. No wheezing or rhonchi.   Abdominal:      General: Bowel sounds are normal. There is no distension.      Palpations: Abdomen is soft.      Tenderness: There is no abdominal tenderness. There is no guarding.   Musculoskeletal:         General: Tenderness (pain with extension of left leg) present. No swelling.      Cervical back: Normal range of motion. No rigidity or tenderness.      Comments: Decreased ROM to LLE s/t to pain   Skin:     General: Skin is warm.      Capillary Refill: Capillary refill takes less than 2 seconds.      Findings: No lesion or rash.   Neurological:      General: No focal deficit present.      Mental Status: She is alert and oriented to person, place, and time.      Sensory: No sensory deficit.      Gait: Gait normal.   Psychiatric:         Mood and Affect: Mood normal.         Behavior: Behavior normal.         CRANIAL NERVES     CN III, IV, VI   Pupils are equal, round, and reactive to light.     Significant Labs: All pertinent labs within the past 24 hours have been reviewed.  A1C:   Recent Labs   Lab 11/02/22  0645   HGBA1C 6.1*     Blood Culture:   Recent Labs   Lab 11/03/22  0007   LABBLOO No Growth to date  No Growth to date     BMP:   Recent Labs   Lab 11/04/22  0600   *      K 3.0*      CO2 27   BUN 9   CREATININE 0.7   CALCIUM 9.8   MG 1.7       CBC:   Recent Labs   Lab 11/03/22  0535 11/04/22  0600   WBC 14.57* 13.56*   HGB 9.5* 9.4*   HCT 29.8* 29.0*    202       CMP:    Recent Labs   Lab 11/03/22  0535 11/04/22  0600    136   K 5.0 3.0*    100   CO2 19* 27   GLU 77 125*   BUN 13 9   CREATININE 0.7 0.7   CALCIUM 9.2 9.8   ANIONGAP 12 9       Lactic Acid:   No results for input(s): LACTATE in the last 48 hours.    TSH:   Recent Labs   Lab 11/01/22  0912   TSH 1.859       Urine Culture: No results for input(s): LABURIN in the last 48 hours.  Urine Studies:   No results for input(s): COLORU, APPEARANCEUA, PHUR, SPECGRAV, PROTEINUA, GLUCUA, KETONESU, BILIRUBINUA, OCCULTUA, NITRITE, UROBILINOGEN, LEUKOCYTESUR, RBCUA, WBCUA, BACTERIA, SQUAMEPITHEL, HYALINECASTS in the last 48 hours.    Invalid input(s): WRIGHTSUR      Significant Imaging: I have reviewed all pertinent imaging results/findings within the past 24 hours.  MRI Hip Without Contrast Left  Narrative: EXAMINATION:  MRI HIP WITHOUT CONTRAST LEFT    CLINICAL HISTORY:  Hip pain, chronic, tendon/ligament abnormality suspected, xray done;    TECHNIQUE:  MRI left hip performed without contrast.    COMPARISON:  Radiographs 11/01/2022    FINDINGS:  Bone marrow signal is maintained.  No fracture.  No marrow infiltrative process.    Left hip exhibits mild generalized chondral thinning.  There is associated acetabular labral fraying and mild osteophyte production.  No joint effusion or synovitis.  There is advanced gluteus minimus and medius tendinosis, likely with partial tears.  There is peritrochanteric edema.    Large field-of-view evaluation of the right hip exhibits similar mild chondral thinning with acetabular fraying and mild osteophyte production.  No joint effusion or synovitis.  There is similar gluteus minimus and medius tendinosis and peritrochanteric edema.    Sacroiliac joints and pubic symphysis are unremarkable.    No pelvic ascites or lymphadenopathy.  Impression: 1. No fracture or marrow infiltrative process.  2. Mild degenerative changes of left hip.  Gluteus minimus and medius tendinosis, likely with  partial tears.  3. Mild degenerative changes of right hip.  Gluteus minimus and medius tendinosis.    Electronically signed by: Bora Short MD  Date:    11/02/2022  Time:    10:41

## 2022-11-04 NOTE — PLAN OF CARE
VSS and Oriented x4. Left hip pain controlled with PRN pain medications. Antibiotic infused per order. Patient ambulating with walker, standby assistance. Voiding pattern regular. Plan of care reviewed with patient. Purposeful rounding done, call light at bed side, bed at lowest position, brakes on, non-skid socks on, will continue to monitor.      Problem: Adult Inpatient Plan of Care  Goal: Absence of Hospital-Acquired Illness or Injury  Outcome: Ongoing, Progressing  Goal: Optimal Comfort and Wellbeing  Outcome: Ongoing, Progressing     Problem: Diabetes Comorbidity  Goal: Blood Glucose Level Within Targeted Range  11/4/2022 0510 by Joe Alegre RN  Outcome: Ongoing, Progressing  11/4/2022 0510 by Joe Alegre RN  Outcome: Ongoing, Progressing     Problem: Pain Acute  Goal: Acceptable Pain Control and Functional Ability  Outcome: Ongoing, Progressing     Problem: Infection  Goal: Absence of Infection Signs and Symptoms  Outcome: Ongoing, Progressing

## 2022-11-04 NOTE — PT/OT/SLP PROGRESS
Physical Therapy Treatment    Patient Name:  Brigitte Sorto   MRN:  [id number]    Recommendations:     Discharge Recommendations:  home health PT, home health OT   Discharge Equipment Recommendations: walker, rolling   Barriers to discharge: Inaccessible home and Decreased caregiver support    Assessment:     Brigitte Sorto is a 62 y.o. female admitted with a medical diagnosis of Gram-positive bacteremia.  She presents with the following impairments/functional limitations:  pain, gait instability, impaired balance, impaired endurance, impaired functional mobility, impaired self care skills, decreased safety awareness.    Sit>stand with RW and SPV  Amb 90' with RW and SBA  Pt with c/o R shoulder/neck pain and L hip pain, otherwise good mobility with RW and progress toward goals  Rec HHPT    Rehab Prognosis: Good; patient would benefit from acute skilled PT services to address these deficits and reach maximum level of function.    Recent Surgery: * No surgery found *      Plan:     During this hospitalization, patient to be seen 3 x/week to address the identified rehab impairments via gait training, therapeutic activities, therapeutic exercises, neuromuscular re-education and progress toward the following goals:    Plan of Care Expires:  12/01/22    Subjective     Chief Complaint: pain  Patient/Family Comments/goals: my daughter lives with me  Pain/Comfort:  Pain Rating 1: 8/10  Location - Side 1: Left  Location - Orientation 1: generalized  Location 1: hip  Pain Addressed 1: Reposition, Distraction, Cessation of Activity  Pain Rating Post-Intervention 1: 8/10  Pain Rating 2: 10/10  Location - Side 2: Right  Location 2: neck (and shoulder)  Pain Addressed 2: Reposition, Distraction, Cessation of Activity, Other (see comments) (ice bag provided at pt request)  Pain Rating Post-Intervention 2: 10/10      Objective:     Communicated with nurse Mariee prior to session.  Patient found  seated on couch  with peripheral IV upon  PT entry to room.     General Precautions: Standard, fall, diabetic   Orthopedic Precautions:N/A   Braces:    Respiratory Status: Room air     Functional Mobility:  Transfers:     Sit to Stand:  supervision with rolling walker  Gait: 90' with RW and SBA      AM-PAC 6 CLICK MOBILITY  Turning over in bed (including adjusting bedclothes, sheets and blankets)?: 3  Sitting down on and standing up from a chair with arms (e.g., wheelchair, bedside commode, etc.): 3  Moving from lying on back to sitting on the side of the bed?: 3  Moving to and from a bed to a chair (including a wheelchair)?: 3  Need to walk in hospital room?: 3  Climbing 3-5 steps with a railing?: 2  Basic Mobility Total Score: 17       Treatment & Education:  Seated therex B LE: LAQ, hip flexion, heel raises x 10  Gait training as noted    Patient left up in chair with all lines intact, call button in reach, and ice pack for R neck/shoulder ..    GOALS:   Multidisciplinary Problems       Physical Therapy Goals          Problem: Physical Therapy    Goal Priority Disciplines Outcome Goal Variances Interventions   Physical Therapy Goal     PT, PT/OT Ongoing, Progressing     Description: Goals to be met by: 22    Patient will increase functional independence with mobility by performin. Sit<>stand with LRAD with SPV.  2. Gait x 100 feet with LRAD with SBA.  3. Ascend/descend 3 step(s) with least restrictive assistive device and SBA.                           Time Tracking:     PT Received On: 22  PT Start Time: 0849     PT Stop Time: 912  PT Total Time (min): 23 min     Billable Minutes: Gait Training 14 and Therapeutic Exercise 9    Treatment Type: Treatment  PT/PTA: PTA     PTA Visit Number: 1     2022

## 2022-11-04 NOTE — SUBJECTIVE & OBJECTIVE
Interval History: No acute events overnight.  Hip pain mildly improved.    Review of Systems   Constitutional:  Negative for chills and fever.   Respiratory:  Negative for shortness of breath.    Cardiovascular:  Negative for chest pain.   Gastrointestinal:  Negative for abdominal pain, nausea and vomiting.   Genitourinary:  Negative for dysuria and frequency.   Musculoskeletal:  Positive for arthralgias.     Objective:     Vital Signs (Most Recent):  Temp: 98.7 °F (37.1 °C) (11/04/22 1204)  Pulse: 86 (11/04/22 1204)  Resp: 18 (11/04/22 1204)  BP: (!) 111/58 (11/04/22 1204)  SpO2: 95 % (11/04/22 1204)   Vital Signs (24h Range):  Temp:  [98 °F (36.7 °C)-99.7 °F (37.6 °C)] 98.7 °F (37.1 °C)  Pulse:  [86-99] 86  Resp:  [14-18] 18  SpO2:  [94 %-96 %] 95 %  BP: (111-160)/(58-75) 111/58     Weight: 78 kg (172 lb)  Body mass index is 28.62 kg/m².  No intake or output data in the 24 hours ending 11/04/22 1521     Physical Exam  Constitutional:       General: She is not in acute distress.  HENT:      Head: Atraumatic.   Eyes:      Conjunctiva/sclera: Conjunctivae normal.   Cardiovascular:      Rate and Rhythm: Normal rate and regular rhythm.      Heart sounds: Normal heart sounds. No murmur heard.  Pulmonary:      Effort: Pulmonary effort is normal.      Breath sounds: Normal breath sounds. No wheezing.   Abdominal:      General: Bowel sounds are normal. There is no distension.      Palpations: Abdomen is soft.      Tenderness: There is no abdominal tenderness.   Musculoskeletal:         General: Tenderness present. No deformity. Normal range of motion.      Cervical back: Neck supple.      Comments: Left hip tender from left buttocks which radiates to pelvis.   Neurological:      Mental Status: She is alert and oriented to person, place, and time.       Significant Labs: All pertinent labs within the past 24 hours have been reviewed.    Significant Imaging: I have reviewed all pertinent imaging results/findings within the  past 24 hours.

## 2022-11-04 NOTE — PT/OT/SLP PROGRESS
Occupational Therapy   Treatment    Name: Brigitte Sorto  MRN: [id number]  Admitting Diagnosis:  Gram-positive bacteremia       Recommendations:     Discharge Recommendations: home health OT, home health PT  Discharge Equipment Recommendations:  walker, rolling  Barriers to discharge:  None    Assessment:     Brigitte Sorto is a 62 y.o. female with a medical diagnosis of Gram-positive bacteremia.   Performance deficits affecting function are impaired endurance, impaired self care skills, impaired functional mobility, gait instability, impaired balance, pain, decreased safety awareness.     Rehab Prognosis:  Good; patient would benefit from acute skilled OT services to address these deficits and reach maximum level of function.       Plan:     Patient to be seen 3 x/week to address the above listed problems via self-care/home management, therapeutic activities  Plan of Care Expires: 11/16/22  Plan of Care Reviewed with: patient    Subjective     Pain/Comfort:  Pain Rating 1:  (Pt not rating pain.  Reporting she had taken pain medication prior to OT session.)  Location - Side 1: Left  Location 1: hip    Objective:     Communicated with: nurse, Jaylene, prior to session.  Patient found  walking in room with RW  with peripheral IV upon OT entry to room.    General Precautions: Standard, diabetic, fall   Orthopedic Precautions:N/A   Braces: N/A  Respiratory Status: Room air     Occupational Performance:     Bed Mobility:    NT     Functional Mobility/Transfers:  Sit to stand: Supervision   Transfer: Supervision with RW; cues for taking smaller turns and offloading weight from left LE  Functional Mobility: Pt instructed to call for assist when getting up with RW; Needing cues for correct use of RW and standing inside RW    Activities of Daily Living:  Toileting: Supervision with RW  Grooming: Supervision with RW  LB Dressing: Supervision; instructed pt on movements to avoid with left LE due to Glut min and med tears      [facility]  6 Click ADL: 19    Treatment & Education:  Role of OT, POC, safety with turning using RW, movements to avoid due to Gluteus Min and Med tears, adaptive techniques for LB self care    Patient left up in chair with all lines intact, call button in reach, and nurse notified    GOALS:   Multidisciplinary Problems       Occupational Therapy Goals          Problem: Occupational Therapy    Goal Priority Disciplines Outcome Interventions   Occupational Therapy Goal     OT, PT/OT Ongoing, Progressing    Description: Goals to be met by: 11/16/2022     Patient will increase functional independence with ADLs by performing:    UE Dressing with Modified Travis with clothing retrieval.  LE Dressing with Modified Travis with clothing retrieval.  Grooming while standing at sink for 3 tasks with Modified Travis.  Toileting from toilet with Modified Travis for hygiene and clothing management.   Toilet transfer to toilet with Modified Travis.                         Time Tracking:     OT Date of Treatment: 11/04/22  OT Start Time: 1215  OT Stop Time: 1228  OT Total Time (min): 13 min    Billable Minutes:Self Care/Home Management 13    OT/[place]: OT          11/4/2022

## 2022-11-04 NOTE — SUBJECTIVE & OBJECTIVE
Past Medical History:   Diagnosis Date    Cancer 5yrs ago    leukemia     CLL (chronic lymphocytic leukemia)     Diabetes mellitus        History reviewed. No pertinent surgical history.    Review of patient's allergies indicates:   Allergen Reactions    Amoxicillin        No current facility-administered medications on file prior to encounter.     Current Outpatient Medications on File Prior to Encounter   Medication Sig    amLODIPine (NORVASC) 5 MG tablet Take 5 mg by mouth once daily.    chlorthalidone (HYGROTEN) 25 MG Tab Take 12.5 mg by mouth once daily.    IMBRUVICA 420 mg Tab Take 1 tablet by mouth once daily.    losartan (COZAAR) 100 MG tablet Take 100 mg by mouth once daily.    multivitamin (THERAGRAN) per tablet Take 1 tablet by mouth once daily.    timolol maleate 0.5% (TIMOPTIC-XE) 0.5 % SolG Place 1 drop into both eyes 2 (two) times daily.     Family History       Problem Relation (Age of Onset)    Breast cancer Sister    Cancer Mother, Sister    Cirrhosis Father    Diabetes Brother    Hypertension Sister    Lung cancer Sister    Throat cancer Mother          Tobacco Use    Smoking status: Never    Smokeless tobacco: Never   Substance and Sexual Activity    Alcohol use: No    Drug use: No    Sexual activity: Not on file     Review of Systems   Constitutional:  Negative for activity change, chills and fever.   HENT:  Negative for congestion and trouble swallowing.    Eyes:  Negative for photophobia and visual disturbance.   Respiratory:  Negative for cough, shortness of breath and wheezing.    Cardiovascular:  Negative for chest pain, palpitations and leg swelling.   Gastrointestinal:  Negative for abdominal pain, diarrhea, nausea and vomiting.        BM yesterday   Endocrine: Negative for polydipsia and polyuria.   Genitourinary:  Negative for dysuria and frequency.   Musculoskeletal:  Positive for arthralgias (Left hip, intermittent, with weight-bearing activities). Negative for gait problem.   Skin:   Negative for rash and wound.   Neurological:  Positive for weakness. Negative for dizziness, speech difficulty and headaches.   Psychiatric/Behavioral:  Negative for confusion and sleep disturbance.    Objective:     Vital Signs (Most Recent):  Temp: 98.7 °F (37.1 °C) (11/04/22 1204)  Pulse: 86 (11/04/22 1204)  Resp: 18 (11/04/22 1204)  BP: (!) 111/58 (11/04/22 1204)  SpO2: 95 % (11/04/22 1204)   Vital Signs (24h Range):  Temp:  [98 °F (36.7 °C)-99.7 °F (37.6 °C)] 98.7 °F (37.1 °C)  Pulse:  [86-99] 86  Resp:  [14-18] 18  SpO2:  [94 %-96 %] 95 %  BP: (111-160)/(58-75) 111/58     Weight: 78 kg (172 lb)  Body mass index is 28.62 kg/m².    Physical Exam  Vitals reviewed.   Constitutional:       General: She is not in acute distress.     Appearance: She is not ill-appearing.   HENT:      Head: Normocephalic and atraumatic.      Nose: No congestion.      Mouth/Throat:      Mouth: Mucous membranes are moist.      Pharynx: Oropharynx is clear.   Eyes:      General:         Right eye: No discharge.         Left eye: No discharge.      Extraocular Movements: Extraocular movements intact.      Pupils: Pupils are equal, round, and reactive to light.   Cardiovascular:      Rate and Rhythm: Normal rate and regular rhythm.      Heart sounds: Normal heart sounds. No murmur heard.  Pulmonary:      Effort: Pulmonary effort is normal. No respiratory distress.      Breath sounds: Normal breath sounds. No wheezing or rhonchi.   Abdominal:      General: Bowel sounds are normal. There is no distension.      Palpations: Abdomen is soft.      Tenderness: There is no abdominal tenderness. There is no guarding.   Musculoskeletal:         General: Tenderness (pain with extension of left leg) present. No swelling.      Cervical back: Normal range of motion. No rigidity or tenderness.      Comments: Decreased ROM to LLE s/t to pain   Skin:     General: Skin is warm.      Capillary Refill: Capillary refill takes less than 2 seconds.       Findings: No lesion or rash.   Neurological:      General: No focal deficit present.      Mental Status: She is alert and oriented to person, place, and time.      Sensory: No sensory deficit.      Gait: Gait normal.   Psychiatric:         Mood and Affect: Mood normal.         Behavior: Behavior normal.         CRANIAL NERVES     CN III, IV, VI   Pupils are equal, round, and reactive to light.     Significant Labs: All pertinent labs within the past 24 hours have been reviewed.  A1C:   Recent Labs   Lab 11/02/22  0645   HGBA1C 6.1*     Blood Culture:   Recent Labs   Lab 11/03/22  0007   LABBLOO No Growth to date  No Growth to date     BMP:   Recent Labs   Lab 11/04/22  0600   *      K 3.0*      CO2 27   BUN 9   CREATININE 0.7   CALCIUM 9.8   MG 1.7       CBC:   Recent Labs   Lab 11/03/22  0535 11/04/22  0600   WBC 14.57* 13.56*   HGB 9.5* 9.4*   HCT 29.8* 29.0*    202       CMP:   Recent Labs   Lab 11/03/22  0535 11/04/22  0600    136   K 5.0 3.0*    100   CO2 19* 27   GLU 77 125*   BUN 13 9   CREATININE 0.7 0.7   CALCIUM 9.2 9.8   ANIONGAP 12 9       Lactic Acid:   No results for input(s): LACTATE in the last 48 hours.    TSH:   Recent Labs   Lab 11/01/22  0912   TSH 1.859       Urine Culture: No results for input(s): LABURIN in the last 48 hours.  Urine Studies:   No results for input(s): COLORU, APPEARANCEUA, PHUR, SPECGRAV, PROTEINUA, GLUCUA, KETONESU, BILIRUBINUA, OCCULTUA, NITRITE, UROBILINOGEN, LEUKOCYTESUR, RBCUA, WBCUA, BACTERIA, SQUAMEPITHEL, HYALINECASTS in the last 48 hours.    Invalid input(s): WRIGHTSUR      Significant Imaging: I have reviewed all pertinent imaging results/findings within the past 24 hours.  MRI Hip Without Contrast Left  Narrative: EXAMINATION:  MRI HIP WITHOUT CONTRAST LEFT    CLINICAL HISTORY:  Hip pain, chronic, tendon/ligament abnormality suspected, xray done;    TECHNIQUE:  MRI left hip performed without  contrast.    COMPARISON:  Radiographs 11/01/2022    FINDINGS:  Bone marrow signal is maintained.  No fracture.  No marrow infiltrative process.    Left hip exhibits mild generalized chondral thinning.  There is associated acetabular labral fraying and mild osteophyte production.  No joint effusion or synovitis.  There is advanced gluteus minimus and medius tendinosis, likely with partial tears.  There is peritrochanteric edema.    Large field-of-view evaluation of the right hip exhibits similar mild chondral thinning with acetabular fraying and mild osteophyte production.  No joint effusion or synovitis.  There is similar gluteus minimus and medius tendinosis and peritrochanteric edema.    Sacroiliac joints and pubic symphysis are unremarkable.    No pelvic ascites or lymphadenopathy.  Impression: 1. No fracture or marrow infiltrative process.  2. Mild degenerative changes of left hip.  Gluteus minimus and medius tendinosis, likely with partial tears.  3. Mild degenerative changes of right hip.  Gluteus minimus and medius tendinosis.    Electronically signed by: Bora Short MD  Date:    11/02/2022  Time:    10:41

## 2022-11-04 NOTE — SUBJECTIVE & OBJECTIVE
Interval History: No acute events overnight.  Hip pain about the same.    Review of Systems   Constitutional:  Negative for chills and fever.   Respiratory:  Negative for shortness of breath.    Cardiovascular:  Negative for chest pain.   Gastrointestinal:  Negative for abdominal pain, nausea and vomiting.   Genitourinary:  Negative for dysuria and frequency.   Musculoskeletal:  Positive for arthralgias.     Objective:     Vital Signs (Most Recent):  Temp: 99.7 °F (37.6 °C) (11/03/22 1923)  Pulse: 99 (11/03/22 1923)  Resp: 17 (11/03/22 1923)  BP: (!) 160/75 (11/03/22 1923)  SpO2: 96 % (11/03/22 1923)   Vital Signs (24h Range):  Temp:  [98.4 °F (36.9 °C)-99.7 °F (37.6 °C)] 99.7 °F (37.6 °C)  Pulse:  [89-99] 99  Resp:  [15-20] 17  SpO2:  [94 %-96 %] 96 %  BP: (123-160)/(58-75) 160/75     Weight: 78 kg (172 lb)  Body mass index is 28.62 kg/m².    Intake/Output Summary (Last 24 hours) at 11/3/2022 1930  Last data filed at 11/2/2022 2344  Gross per 24 hour   Intake 960 ml   Output --   Net 960 ml        Physical Exam  Constitutional:       General: She is not in acute distress.  HENT:      Head: Atraumatic.   Eyes:      Conjunctiva/sclera: Conjunctivae normal.   Cardiovascular:      Rate and Rhythm: Normal rate and regular rhythm.      Heart sounds: Normal heart sounds. No murmur heard.  Pulmonary:      Effort: Pulmonary effort is normal.      Breath sounds: Normal breath sounds. No wheezing.   Abdominal:      General: Bowel sounds are normal. There is no distension.      Palpations: Abdomen is soft.      Tenderness: There is no abdominal tenderness.   Musculoskeletal:         General: Tenderness present. No deformity. Normal range of motion.      Cervical back: Neck supple.      Comments: Left hip tender from left buttocks which radiates to pelvis.   Neurological:      Mental Status: She is alert and oriented to person, place, and time.       Significant Labs: All pertinent labs within the past 24 hours have been  reviewed.    Significant Imaging: I have reviewed all pertinent imaging results/findings within the past 24 hours.

## 2022-11-04 NOTE — ASSESSMENT & PLAN NOTE
Patient with elevated white count, inflammatory markers including elevated procalcitonin.  Patient with 2/2 positive blood cultures with gram positive cocci.  Improving with antibiotics.  Continue antibiotic therapy with increased dose of ceftriaxone.  Source unclear.  Despite chest radiograph findings patient's clinical picture not consistent with pneumonia.  Concern for hip infection however MRI not clear for infection.  Consulted orthopedic surgery who do not feel there is evidence of hip infection.  ID to evaluate and recommend further imaging to evaluate for possible psoas abscess.  Discussed radiology recommending CT abdomen pelvis with intravenous contrast as the best modality to evaluate further.  Patient had some renal insufficiency on admission however serum creatinine back to baseline.

## 2022-11-04 NOTE — PROGRESS NOTES
"[facility] Medicine  Progress Note    Patient Name: Brigitte Sorto  MRN: [id number]  Patient Class: IP- Inpatient   Admission Date: 11/1/2022  Length of Stay: 2 days  Attending Physician: Miah Burns MD  Primary Care Provider: Ace Kyle MD        Subjective:     Principal Problem:Gram-positive bacteremia        HPI:  Per Rosario Prado, DNP:    "Ms. Briggs is 62-year-old female with a past medical history of chronic lymphocyte leukemia, diabetes type 2, and hypertension.  She came to the emergency department for left hip pain that began 1 month ago.  Patient describes the pain as sharp, intermittent, and worse with weight-bearing movement.  It radiates around her but to the front of her pelvic area.  It does come and go.  She has had some pain free days.  Patient has been taking about 4 Tylenol a day for the past month and has tried a lidocaine patch.  She has visited the ED multiple times for the same complaint.  Hip radiographs have been unrevealing.  Patient denies any trauma, lower abdominal pain, back or neck pain. While in the ED, patient was hypotensive.  Her blood work found elevated white blood cell count over 17 along with the Procalcitonin of 8.  Patient also had an acute kidney injury with a creatinine of 1.7.  She had elevated liver enzymes as well.  Sepsis protocol was initiated.  Patient admitted to hospital medicine for further management."      Overview/Hospital Course:  Patient is a 62 year-old woman with hypertension, diabetes mellitus type II, chronic lymphocyte leukemia who presented worsening/persistent left hip pain.  Lab studies show elevated inflammatory markers.  Plain film unremarkable. Follow-up MRI  of the left hip negative for fracture, marrow infiltrative process, or abscess and revealed inflammation of gluteus minimus and medius with likely partial tear.  Patient also presents with evidence of acute kidney injury which is improving with " volume resuscitation.  Blood cultures positive for Streptococcus pneumoniae.   Source of bacteremia unclear.  ID consulted.      Interval History: No acute events overnight.  Hip pain about the same.    Review of Systems   Constitutional:  Negative for chills and fever.   Respiratory:  Negative for shortness of breath.    Cardiovascular:  Negative for chest pain.   Gastrointestinal:  Negative for abdominal pain, nausea and vomiting.   Genitourinary:  Negative for dysuria and frequency.   Musculoskeletal:  Positive for arthralgias.     Objective:     Vital Signs (Most Recent):  Temp: 99.7 °F (37.6 °C) (11/03/22 1923)  Pulse: 99 (11/03/22 1923)  Resp: 17 (11/03/22 1923)  BP: (!) 160/75 (11/03/22 1923)  SpO2: 96 % (11/03/22 1923)   Vital Signs (24h Range):  Temp:  [98.4 °F (36.9 °C)-99.7 °F (37.6 °C)] 99.7 °F (37.6 °C)  Pulse:  [89-99] 99  Resp:  [15-20] 17  SpO2:  [94 %-96 %] 96 %  BP: (123-160)/(58-75) 160/75     Weight: 78 kg (172 lb)  Body mass index is 28.62 kg/m².    Intake/Output Summary (Last 24 hours) at 11/3/2022 1930  Last data filed at 11/2/2022 2344  Gross per 24 hour   Intake 960 ml   Output --   Net 960 ml        Physical Exam  Constitutional:       General: She is not in acute distress.  HENT:      Head: Atraumatic.   Eyes:      Conjunctiva/sclera: Conjunctivae normal.   Cardiovascular:      Rate and Rhythm: Normal rate and regular rhythm.      Heart sounds: Normal heart sounds. No murmur heard.  Pulmonary:      Effort: Pulmonary effort is normal.      Breath sounds: Normal breath sounds. No wheezing.   Abdominal:      General: Bowel sounds are normal. There is no distension.      Palpations: Abdomen is soft.      Tenderness: There is no abdominal tenderness.   Musculoskeletal:         General: Tenderness present. No deformity. Normal range of motion.      Cervical back: Neck supple.      Comments: Left hip tender from left buttocks which radiates to pelvis.   Neurological:      Mental Status: She is  alert and oriented to person, place, and time.       Significant Labs: All pertinent labs within the past 24 hours have been reviewed.    Significant Imaging: I have reviewed all pertinent imaging results/findings within the past 24 hours.        Assessment/Plan:      * Gram-positive bacteremia  Patient with elevated white count, inflammatory markers including elevated procalcitonin.  Patient with 2/2 positive blood cultures with gram positive cocci.  Improving with antibiotics.  Continue antibiotic therapy with increased dose of ceftriaxone.  Source unclear.  Despite chest radiograph findings patient's clinical picture not consistent with pneumonia.  Concern for hip infection however MRI not clear for infection.  Consulted orthopedic surgery who do not feel there is evidence of hip infection.  ID to evaluate.    Hip pain, acute, left  No history of trauma.  Plain film unremarkable. Non-specific inflammatory markers elevated. Follow-up MRI concerning for inflammation of gluteus minimus and tendinosis with likely partial tear but without fracture, infiltrative process.  Continue therapy and pain control.  Consulted orthopedic surgery who do not feel hip is source of infection and recommending NSAIDs, therapy, and possible bursa injection if no improvement.  Acute kidney injury.  Will give short course of NSAID and closely monitor kidney function.    Hypertension  Normotensive.    Transaminitis  Patient denies abdominal pain, nausea, vomiting.  Incidentally found elevated liver enzymes.  No previous documented elevations.  -alkaline phosphatase: 370  -AST:  197  -ALT:  229  -total bilirubin: 2.7  -ultrasound abdominal limited:  No acute abnormalities, no ductal dilatation  -hepatitis panel:  Nonreactive  -acetaminophen level: less than 3  -Will monitor CMP and consult GI if no improvement      Type 2 diabetes mellitus, without long-term current use of insulin  Adjust regimen for better control.    CLL (chronic  lymphocytic leukemia)  Granulomatous Inflammation of the Lung   Patient was diagnosed in 2008.  She is a patient of 's with [facility]. Last appointment was 10/26.  Pt gets a yearly CT chest to monitor lung nodules present since 2009. Pt take (Imbruvica) Ibrutinib daily.        VTE Risk Mitigation (From admission, onward)         Ordered     enoxaparin injection 40 mg  Daily         11/01/22 1520     IP VTE HIGH RISK PATIENT  Once         11/01/22 1520     Place sequential compression device  Until discontinued         11/01/22 1520                Miah Burns MD  Department of Hospital Medicine   [facility] - Med S[facility])

## 2022-11-04 NOTE — CONSULTS
[facility])  Orthopedics  Consult Note    Patient Name: Brigitte Sorto  MRN: [id number]  Admission Date: 11/1/2022  Hospital Length of Stay: 2 days  Attending Provider: Miah Burns MD  Primary Care Provider: Ace Kyle MD    Patient information was obtained from patient and ER records.     Inpatient consult to Orthopedic Surgery  Consult performed by: William Juárez PA-C  Consult ordered by: Miah Burns MD  Reason for consult: Left hip pain      Subjective:     Principal Problem:Gram-positive bacteremia    Chief Complaint:   Chief Complaint   Patient presents with    Back Pain     Pt c.o left lower back pain that radiates to buttock and around in groin onset off and on for 2 weeks worsened over last 2 days.  Pt denies urinary symptoms.  Pt denies loss of bowel or bladder.  Pt states she has not been eating and drinking for last 2 day due to no appetite due to pain. Pt c.o feeling lightheaded.     hypotension        HPI: Ms. Sorto is a 63 y/o F with left hip pain for about 2 weeks now. She denies any known injury to the hip, says it started spontaneously. No falls.     Past Medical History:   Diagnosis Date    Cancer 5yrs ago    leukemia     CLL (chronic lymphocytic leukemia)     Diabetes mellitus        History reviewed. No pertinent surgical history.    Review of patient's allergies indicates:   Allergen Reactions    Amoxicillin        Current Facility-Administered Medications   Medication    acetaminophen tablet 1,000 mg    cefTRIAXone (ROCEPHIN) 2 g/50 mL D5W IVPB    celecoxib capsule 100 mg    dextrose 10% bolus 125 mL    dextrose 10% bolus 250 mL    enoxaparin injection 40 mg    gabapentin capsule 200 mg    glucagon (human recombinant) injection 1 mg    glucose chewable tablet 16 g    glucose chewable tablet 24 g    ibrutinib Tab 1 tablet    insulin aspart U-100 pen 0-5 Units    LIDOcaine 5 % patch 1 patch    multivitamin tablet    naloxone 0.4 mg/mL injection 0.02 mg     "ondansetron injection 4 mg    oxyCODONE immediate release tablet 5 mg    polyethylene glycol packet 17 g    timolol maleate 0.25% ophthalmic solution 1 drop    vancomycin - pharmacy to dose    vancomycin in dextrose 5 % 1 gram/250 mL IVPB 1,000 mg     Family History       Problem Relation (Age of Onset)    Breast cancer Sister    Cancer Mother, Sister    Cirrhosis Father    Diabetes Brother    Hypertension Sister    Lung cancer Sister    Throat cancer Mother          Tobacco Use    Smoking status: Never    Smokeless tobacco: Never   Substance and Sexual Activity    Alcohol use: No    Drug use: No    Sexual activity: Not on file     Review of Systems   Constitutional: Negative for fever and night sweats.   HENT:  Negative for hearing loss and sore throat.    Eyes:  Negative for blurred vision, discharge and double vision.   Cardiovascular:  Negative for chest pain, dyspnea on exertion and syncope.   Respiratory:  Negative for cough and shortness of breath.    Skin:  Negative for rash and skin cancer.   Musculoskeletal:  Positive for joint pain. Negative for back pain and neck pain.   Gastrointestinal:  Negative for abdominal pain, nausea and vomiting.   Genitourinary:  Negative for bladder incontinence and hematuria.   Neurological:  Negative for loss of balance and sensory change.   Psychiatric/Behavioral:  Negative for depression. The patient is not nervous/anxious.    Objective:     Vital Signs (Most Recent):  Temp: 99.7 °F (37.6 °C) (11/03/22 1923)  Pulse: 99 (11/03/22 1923)  Resp: 14 (11/03/22 2058)  BP: (!) 160/75 (11/03/22 1923)  SpO2: 96 % (11/03/22 1923)   Vital Signs (24h Range):  Temp:  [98.4 °F (36.9 °C)-99.7 °F (37.6 °C)] 99.7 °F (37.6 °C)  Pulse:  [89-99] 99  Resp:  [14-20] 14  SpO2:  [94 %-96 %] 96 %  BP: (123-160)/(58-75) 160/75     Weight: 78 kg (172 lb)  Height: 5' 5" (165.1 cm)  Body mass index is 28.62 kg/m².      Intake/Output Summary (Last 24 hours) at 11/3/2022 4017  Last data filed at " 11/2/2022 2344  Gross per 24 hour   Intake 120 ml   Output --   Net 120 ml       General    Constitutional: She is oriented to person, place, and time. She appears well-developed and well-nourished. No distress.   HENT:   Head: Normocephalic and atraumatic.   Eyes: Conjunctivae and EOM are normal. Pupils are equal, round, and reactive to light.   Neck: Neck supple.   Cardiovascular:  Normal rate, regular rhythm and normal heart sounds.      Exam reveals no gallop and no friction rub.       No murmur heard.  Pulmonary/Chest: Effort normal and breath sounds normal. No respiratory distress.   Abdominal: Soft. Bowel sounds are normal. She exhibits no distension. There is no abdominal tenderness.   Neurological: She is alert and oriented to person, place, and time. She has normal reflexes.   Psychiatric: She has a normal mood and affect. Her behavior is normal. Judgment and thought content normal.         Left Hip Exam     Tenderness   The patient tender to palpation of the trochanteric bursa.    Range of Motion   Flexion:  100   External rotation:  40   Internal rotation: 15     Comments:  Patient noted minimal discomfort with passive range of motion. No pain with log roll. Tenderness to the troch bursa and ASIS. No signs of joint involvement. SILT, NVI.          Significant Labs: All pertinent labs within the past 24 hours have been reviewed.    Significant Imaging: I have reviewed all pertinent imaging results/findings.    Assessment/Plan:     Active Diagnoses:    Diagnosis Date Noted POA    PRINCIPAL PROBLEM:  Gram-positive bacteremia [R78.81] 11/01/2022 Yes    Hypertension [I10] 11/01/2022 Yes    Type 2 diabetes mellitus, without long-term current use of insulin [E11.9] 11/01/2022 Yes    Transaminitis [R74.01] 11/01/2022 Yes    Hip pain, acute, left [M25.552] 11/01/2022 Yes    CLL (chronic lymphocytic leukemia) [C91.10] 01/26/2015 Yes      Problems Resolved During this Admission:    Diagnosis Date Noted Date  Resolved POA    Acute kidney injury [N17.9] 11/01/2022 11/03/2022 Yes    Pneumonia [J18.9] 11/01/2022 11/02/2022 Yes     Discussed options, recommended a trial of Diclofenac and will see how she responds. If no improvement can consider a troch bursa injection, however given white count elevation, would ideally prefer non-steroidal treatment to prevent hyperglycemia.     Thank you for your consult. I will follow-up with patient. Please contact us if you have any additional questions.    William Juárez PA-C  Orthopedics  [facility] - Med [facility] ([facility])

## 2022-11-04 NOTE — ASSESSMENT & PLAN NOTE
"62F with h/o leukemia, t2dm, htn admitted 11/1 with acute on chronic L hip pain.Bcx x 2 with strep pneumo. Repeat pending. Mri hip - partial tear gluteus.No echo done. On vanc/ceftriaxone. ID consulted for "Gram positive bactermeia, source unclear"    Unclear source of bacteremia (sinusitis? Cellulitis?), agree with ruling out septic arthritis/OM/abscess in L hip area. I do worry about retained components of prior port, which can be seeded from bacteremia    Recommendations:   - agree with ortho eval to r/o septic L hip  - please ensure she's had adequate imaging to r/o L psoas abscess  - de-escalate to ceftriaxone. On d/c anticipating de-escalating to po levaquin, so would avoid placing picc line (I confirmed with micro that isolate is susceptible)  - repeat bcx to document clearance  - f/u 2d echo  - consider ultrasound of prior port area to eval for fluid collection/signs of infection at retained leads.       "

## 2022-11-05 PROBLEM — E87.6 HYPOKALEMIA: Status: RESOLVED | Noted: 2022-11-04 | Resolved: 2022-11-05

## 2022-11-05 PROBLEM — R91.8 PULMONARY NODULES: Status: ACTIVE | Noted: 2022-11-05

## 2022-11-05 LAB
ANION GAP SERPL CALC-SCNC: 7 MMOL/L (ref 8–16)
BASOPHILS # BLD AUTO: 0.03 K/UL (ref 0–0.2)
BASOPHILS NFR BLD: 0.3 % (ref 0–1.9)
BUN SERPL-MCNC: 10 MG/DL (ref 8–23)
CALCIUM SERPL-MCNC: 9.8 MG/DL (ref 8.7–10.5)
CHLORIDE SERPL-SCNC: 104 MMOL/L (ref 95–110)
CO2 SERPL-SCNC: 29 MMOL/L (ref 23–29)
CREAT SERPL-MCNC: 0.6 MG/DL (ref 0.5–1.4)
DIFFERENTIAL METHOD: ABNORMAL
EOSINOPHIL # BLD AUTO: 0.2 K/UL (ref 0–0.5)
EOSINOPHIL NFR BLD: 1.7 % (ref 0–8)
ERYTHROCYTE [DISTWIDTH] IN BLOOD BY AUTOMATED COUNT: 13.8 % (ref 11.5–14.5)
EST. GFR  (NO RACE VARIABLE): >60 ML/MIN/1.73 M^2
GLUCOSE SERPL-MCNC: 111 MG/DL (ref 70–110)
HCT VFR BLD AUTO: 29.7 % (ref 37–48.5)
HGB BLD-MCNC: 9.6 G/DL (ref 12–16)
IMM GRANULOCYTES # BLD AUTO: 0.11 K/UL (ref 0–0.04)
IMM GRANULOCYTES NFR BLD AUTO: 1.2 % (ref 0–0.5)
LYMPHOCYTES # BLD AUTO: 1.6 K/UL (ref 1–4.8)
LYMPHOCYTES NFR BLD: 17.9 % (ref 18–48)
MAGNESIUM SERPL-MCNC: 1.9 MG/DL (ref 1.6–2.6)
MCH RBC QN AUTO: 23.8 PG (ref 27–31)
MCHC RBC AUTO-ENTMCNC: 32.3 G/DL (ref 32–36)
MCV RBC AUTO: 74 FL (ref 82–98)
MONOCYTES # BLD AUTO: 0.9 K/UL (ref 0.3–1)
MONOCYTES NFR BLD: 10.1 % (ref 4–15)
NEUTROPHILS # BLD AUTO: 6.1 K/UL (ref 1.8–7.7)
NEUTROPHILS NFR BLD: 68.8 % (ref 38–73)
NRBC BLD-RTO: 0 /100 WBC
PHOSPHATE SERPL-MCNC: 3.3 MG/DL (ref 2.7–4.5)
PLATELET # BLD AUTO: 230 K/UL (ref 150–450)
PMV BLD AUTO: 12.2 FL (ref 9.2–12.9)
POCT GLUCOSE: 102 MG/DL (ref 70–110)
POCT GLUCOSE: 136 MG/DL (ref 70–110)
POCT GLUCOSE: 137 MG/DL (ref 70–110)
POCT GLUCOSE: 145 MG/DL (ref 70–110)
POTASSIUM SERPL-SCNC: 3.5 MMOL/L (ref 3.5–5.1)
RBC # BLD AUTO: 4.04 M/UL (ref 4–5.4)
SODIUM SERPL-SCNC: 140 MMOL/L (ref 136–145)
WBC # BLD AUTO: 8.89 K/UL (ref 3.9–12.7)

## 2022-11-05 PROCEDURE — 83735 ASSAY OF MAGNESIUM: CPT | Performed by: HOSPITALIST

## 2022-11-05 PROCEDURE — 11000001 HC ACUTE MED/SURG PRIVATE ROOM

## 2022-11-05 PROCEDURE — 99233 SBSQ HOSP IP/OBS HIGH 50: CPT | Mod: ,,, | Performed by: HOSPITALIST

## 2022-11-05 PROCEDURE — 25000003 PHARM REV CODE 250: Performed by: NURSE PRACTITIONER

## 2022-11-05 PROCEDURE — 25000003 PHARM REV CODE 250: Performed by: PHYSICIAN ASSISTANT

## 2022-11-05 PROCEDURE — 85025 COMPLETE CBC W/AUTO DIFF WBC: CPT | Performed by: HOSPITALIST

## 2022-11-05 PROCEDURE — 25000003 PHARM REV CODE 250: Performed by: HOSPITALIST

## 2022-11-05 PROCEDURE — 36415 COLL VENOUS BLD VENIPUNCTURE: CPT | Performed by: HOSPITALIST

## 2022-11-05 PROCEDURE — 63600175 PHARM REV CODE 636 W HCPCS: Performed by: HOSPITALIST

## 2022-11-05 PROCEDURE — 84100 ASSAY OF PHOSPHORUS: CPT | Performed by: HOSPITALIST

## 2022-11-05 PROCEDURE — 80048 BASIC METABOLIC PNL TOTAL CA: CPT | Performed by: HOSPITALIST

## 2022-11-05 PROCEDURE — 99233 PR SUBSEQUENT HOSPITAL CARE,LEVL III: ICD-10-PCS | Mod: ,,, | Performed by: HOSPITALIST

## 2022-11-05 PROCEDURE — 63600175 PHARM REV CODE 636 W HCPCS: Performed by: NURSE PRACTITIONER

## 2022-11-05 RX ADMIN — CELECOXIB 100 MG: 100 CAPSULE ORAL at 09:11

## 2022-11-05 RX ADMIN — ENOXAPARIN SODIUM 40 MG: 100 INJECTION SUBCUTANEOUS at 09:11

## 2022-11-05 RX ADMIN — CEFTRIAXONE 2 G: 2 INJECTION, SOLUTION INTRAVENOUS at 08:11

## 2022-11-05 RX ADMIN — OXYCODONE 5 MG: 5 TABLET ORAL at 08:11

## 2022-11-05 RX ADMIN — OXYCODONE 5 MG: 5 TABLET ORAL at 03:11

## 2022-11-05 RX ADMIN — LIDOCAINE 1 PATCH: 50 PATCH CUTANEOUS at 11:11

## 2022-11-05 RX ADMIN — CELECOXIB 100 MG: 100 CAPSULE ORAL at 08:11

## 2022-11-05 RX ADMIN — THERA TABS 1 TABLET: TAB at 09:11

## 2022-11-05 RX ADMIN — GABAPENTIN 200 MG: 100 CAPSULE ORAL at 09:11

## 2022-11-05 RX ADMIN — TIMOLOL MALEATE 1 DROP: 2.5 SOLUTION/ DROPS OPHTHALMIC at 09:11

## 2022-11-05 RX ADMIN — OXYCODONE 5 MG: 5 TABLET ORAL at 09:11

## 2022-11-05 RX ADMIN — GABAPENTIN 200 MG: 100 CAPSULE ORAL at 08:11

## 2022-11-05 RX ADMIN — TIMOLOL MALEATE 1 DROP: 2.5 SOLUTION/ DROPS OPHTHALMIC at 08:11

## 2022-11-05 NOTE — ASSESSMENT & PLAN NOTE
Patient with elevated white count, inflammatory markers including elevated procalcitonin.  Patient with 2/2 positive blood cultures with gram positive cocci.  Improving with antibiotics.  Continue antibiotic therapy with increased dose of ceftriaxone.  Source unclear.  Despite chest radiograph findings patient's clinical picture not consistent with pneumonia.  Concern for hip infection however MRI not clear for infection.  Consulted orthopedic surgery who do not feel there is evidence of hip infection.  ID to evaluate and recommend further imaging to evaluate for possible psoas abscess.  Discussed radiology recommending CT abdomen pelvis with intravenous contrast as the best modality to evaluate further.  CT of the abdomen pelvis is not reveal evidence of psoas abscess or other fluid collection concerning for abscess.  Continue intravenous ceftriaxone.

## 2022-11-05 NOTE — PROGRESS NOTES
"[facility] [place] [facility] Medicine  Progress Note    Patient Name: Brigitte Sorto  MRN: [id number]  Patient Class: IP- Inpatient   Admission Date: 11/1/2022  Length of Stay: 4 days  Attending Physician: Miah Burns MD  Primary Care Provider: Ace Kyle MD        Subjective:     Principal Problem:Gram-positive bacteremia        HPI:  Per Rosario Prado, DNP:    "Ms. Briggs is 62-year-old female with a past medical history of chronic lymphocyte leukemia, diabetes type 2, and hypertension.  She came to the emergency department for left hip pain that began 1 month ago.  Patient describes the pain as sharp, intermittent, and worse with weight-bearing movement.  It radiates around her but to the front of her pelvic area.  It does come and go.  She has had some pain free days.  Patient has been taking about 4 Tylenol a day for the past month and has tried a lidocaine patch.  She has visited the ED multiple times for the same complaint.  Hip radiographs have been unrevealing.  Patient denies any trauma, lower abdominal pain, back or neck pain. While in the ED, patient was hypotensive.  Her blood work found elevated white blood cell count over 17 along with the Procalcitonin of 8.  Patient also had an acute kidney injury with a creatinine of 1.7.  She had elevated liver enzymes as well.  Sepsis protocol was initiated.  Patient admitted to hospital medicine for further management."      Overview/Hospital Course:  Patient is a 62 year-old woman with hypertension, diabetes mellitus type II, chronic lymphocyte leukemia who presented worsening/persistent left hip pain.  Lab studies show elevated inflammatory markers.  Plain film unremarkable. Follow-up MRI  of the left hip negative for fracture, marrow infiltrative process, or abscess and revealed inflammation of gluteus minimus and medius with likely partial tear.  Patient also presents with evidence of acute kidney injury which is improving with " volume resuscitation.  Blood cultures positive for Streptococcus pneumoniae.   Source of bacteremia unclear.  ID consulted.      No new subjective & objective note has been filed under this hospital service since the last note was generated.      Assessment/Plan:      * Gram-positive bacteremia  Patient with elevated white count, inflammatory markers including elevated procalcitonin.  Patient with 2/2 positive blood cultures with gram positive cocci.  Improving with antibiotics.  Continue antibiotic therapy with increased dose of ceftriaxone.  Source unclear.  Despite chest radiograph findings patient's clinical picture not consistent with pneumonia.  Concern for hip infection however MRI not clear for infection.  Consulted orthopedic surgery who do not feel there is evidence of hip infection.  ID to evaluate and recommend further imaging to evaluate for possible psoas abscess.  Discussed radiology recommending CT abdomen pelvis with intravenous contrast as the best modality to evaluate further.  CT of the abdomen pelvis is not reveal evidence of psoas abscess or other fluid collection concerning for abscess.  Continue intravenous ceftriaxone.    Hip pain, acute, left  No history of trauma.  Plain film unremarkable. Non-specific inflammatory markers elevated. Follow-up MRI concerning for inflammation of gluteus minimus and tendinosis with likely partial tear but without fracture, infiltrative process.  Continue therapy and pain control.  Consulted orthopedic surgery who do not feel hip is source of infection and recommending NSAIDs, therapy, and possible bursa injection if no improvement.  Acute kidney injury resolved.  Short course of NSAIDs recommended.  Kidney function stable.  Continue to closely monitor kidney function.    Hypertension  Normotensive.    Pulmonary nodules  CT scan revealed incidental bilateral pulmonary nodules.  Repeat CT surveillance recommended in 3 months per Fleischner society  guidelines.    Transaminitis  Mildly elevated.  Repeat levels tomorrow.      Type 2 diabetes mellitus, without long-term current use of insulin  Reasonably controlled with current regimen.  Will continue with current regimen and continue to monitor.    CLL (chronic lymphocytic leukemia)  Granulomatous Inflammation of the Lung   Patient was diagnosed in 2008.  She is a patient of 's with [facility]. Last appointment was 10/26.  Pt gets a yearly CT chest to monitor lung nodules present since 2009. Pt take (Imbruvica) Ibrutinib daily.        VTE Risk Mitigation (From admission, onward)         Ordered     enoxaparin injection 40 mg  Daily         11/01/22 1520     IP VTE HIGH RISK PATIENT  Once         11/01/22 1520     Place sequential compression device  Until discontinued         11/01/22 1520                Miah Burns MD  Department of Hospital Medicine   [facility] - Med Surg [facility])

## 2022-11-05 NOTE — PLAN OF CARE
Problem: Adult Inpatient Plan of Care  Goal: Plan of Care Review  Outcome: Ongoing, Progressing  Goal: Patient-Specific Goal (Individualized)  Outcome: Ongoing, Progressing  Goal: Absence of Hospital-Acquired Illness or Injury  Outcome: Ongoing, Progressing  Goal: Optimal Comfort and Wellbeing  Outcome: Ongoing, Progressing  Goal: Readiness for Transition of Care  Outcome: Ongoing, Progressing     Problem: Fluid and Electrolyte Imbalance (Acute Kidney Injury/Impairment)  Goal: Fluid and Electrolyte Balance  Outcome: Ongoing, Progressing     Problem: Oral Intake Inadequate (Acute Kidney Injury/Impairment)  Goal: Optimal Nutrition Intake  Outcome: Ongoing, Progressing     Problem: Renal Function Impairment (Acute Kidney Injury/Impairment)  Goal: Effective Renal Function  Outcome: Ongoing, Progressing     Problem: Fluid Imbalance (Pneumonia)  Goal: Fluid Balance  Outcome: Ongoing, Progressing     Problem: Infection (Pneumonia)  Goal: Resolution of Infection Signs and Symptoms  Outcome: Ongoing, Progressing     Problem: Respiratory Compromise (Pneumonia)  Goal: Effective Oxygenation and Ventilation  Outcome: Ongoing, Progressing     Problem: Diabetes Comorbidity  Goal: Blood Glucose Level Within Targeted Range  Outcome: Ongoing, Progressing     Problem: Adjustment to Illness (Sepsis/Septic Shock)  Goal: Optimal Coping  Outcome: Ongoing, Progressing     Problem: Bleeding (Sepsis/Septic Shock)  Goal: Absence of Bleeding  Outcome: Ongoing, Progressing     Problem: Glycemic Control Impaired (Sepsis/Septic Shock)  Goal: Blood Glucose Level Within Desired Range  Outcome: Ongoing, Progressing     Problem: Infection Progression (Sepsis/Septic Shock)  Goal: Absence of Infection Signs and Symptoms  Outcome: Ongoing, Progressing     Problem: Nutrition Impaired (Sepsis/Septic Shock)  Goal: Optimal Nutrition Intake  Outcome: Ongoing, Progressing     Problem: Pain Acute  Goal: Acceptable Pain Control and Functional  Ability  Outcome: Ongoing, Progressing     Problem: Infection  Goal: Absence of Infection Signs and Symptoms  Outcome: Ongoing, Progressing

## 2022-11-05 NOTE — SUBJECTIVE & OBJECTIVE
Interval History: No acute events overnight.  Hip pain improving.    Review of Systems   Constitutional:  Negative for chills and fever.   Respiratory:  Negative for shortness of breath.    Cardiovascular:  Negative for chest pain.   Gastrointestinal:  Negative for abdominal pain, nausea and vomiting.   Genitourinary:  Negative for dysuria and frequency.   Musculoskeletal:  Positive for arthralgias.     Objective:     Vital Signs (Most Recent):  Temp: 98.2 °F (36.8 °C) (11/05/22 1226)  Pulse: 83 (11/05/22 1226)  Resp: 16 (11/05/22 1550)  BP: (!) 141/64 (11/05/22 1226)  SpO2: 95 % (11/05/22 1226)   Vital Signs (24h Range):  Temp:  [98.2 °F (36.8 °C)-98.8 °F (37.1 °C)] 98.2 °F (36.8 °C)  Pulse:  [77-90] 83  Resp:  [16-18] 16  SpO2:  [95 %-97 %] 95 %  BP: (117-155)/(59-74) 141/64     Weight: 78 kg (172 lb)  Body mass index is 28.62 kg/m².    Intake/Output Summary (Last 24 hours) at 11/5/2022 1702  Last data filed at 11/5/2022 0344  Gross per 24 hour   Intake 240 ml   Output --   Net 240 ml        Physical Exam  Constitutional:       General: She is not in acute distress.  HENT:      Head: Atraumatic.   Eyes:      Conjunctiva/sclera: Conjunctivae normal.   Cardiovascular:      Rate and Rhythm: Normal rate and regular rhythm.      Heart sounds: Normal heart sounds. No murmur heard.  Pulmonary:      Effort: Pulmonary effort is normal.      Breath sounds: Normal breath sounds. No wheezing.   Abdominal:      General: Bowel sounds are normal. There is no distension.      Palpations: Abdomen is soft.      Tenderness: There is no abdominal tenderness.   Musculoskeletal:         General: Tenderness present. No deformity. Normal range of motion.      Cervical back: Neck supple.      Comments: Left hip tender from left buttocks which radiates to pelvis.   Neurological:      Mental Status: She is alert and oriented to person, place, and time.       Significant Labs: All pertinent labs within the past 24 hours have been  reviewed.    Significant Imaging: I have reviewed all pertinent imaging results/findings within the past 24 hours.

## 2022-11-05 NOTE — PROGRESS NOTES
"[facility] Medicine  Progress Note    Patient Name: Brigitte Sorto  MRN: [id number]  Patient Class: IP- Inpatient   Admission Date: 11/1/2022  Length of Stay: 4 days  Attending Physician: Miah Burns MD  Primary Care Provider: Ace Kyle MD        Subjective:     Principal Problem:Gram-positive bacteremia        HPI:  Per Rosario Prado, DNP:    "Ms. Briggs is 62-year-old female with a past medical history of chronic lymphocyte leukemia, diabetes type 2, and hypertension.  She came to the emergency department for left hip pain that began 1 month ago.  Patient describes the pain as sharp, intermittent, and worse with weight-bearing movement.  It radiates around her but to the front of her pelvic area.  It does come and go.  She has had some pain free days.  Patient has been taking about 4 Tylenol a day for the past month and has tried a lidocaine patch.  She has visited the ED multiple times for the same complaint.  Hip radiographs have been unrevealing.  Patient denies any trauma, lower abdominal pain, back or neck pain. While in the ED, patient was hypotensive.  Her blood work found elevated white blood cell count over 17 along with the Procalcitonin of 8.  Patient also had an acute kidney injury with a creatinine of 1.7.  She had elevated liver enzymes as well.  Sepsis protocol was initiated.  Patient admitted to hospital medicine for further management."      Overview/Hospital Course:  Patient is a 62 year-old woman with hypertension, diabetes mellitus type II, chronic lymphocyte leukemia who presented worsening/persistent left hip pain.  Lab studies show elevated inflammatory markers.  Plain film unremarkable. Follow-up MRI  of the left hip negative for fracture, marrow infiltrative process, or abscess and revealed inflammation of gluteus minimus and medius with likely partial tear.  Patient also presents with evidence of acute kidney injury which is improving with " volume resuscitation.  Blood cultures positive for Streptococcus pneumoniae.   Source of bacteremia unclear.  ID consulted.      Interval History: No acute events overnight.  Hip pain improving.    Review of Systems   Constitutional:  Negative for chills and fever.   Respiratory:  Negative for shortness of breath.    Cardiovascular:  Negative for chest pain.   Gastrointestinal:  Negative for abdominal pain, nausea and vomiting.   Genitourinary:  Negative for dysuria and frequency.   Musculoskeletal:  Positive for arthralgias.     Objective:     Vital Signs (Most Recent):  Temp: 98.2 °F (36.8 °C) (11/05/22 1226)  Pulse: 83 (11/05/22 1226)  Resp: 16 (11/05/22 1550)  BP: (!) 141/64 (11/05/22 1226)  SpO2: 95 % (11/05/22 1226)   Vital Signs (24h Range):  Temp:  [98.2 °F (36.8 °C)-98.8 °F (37.1 °C)] 98.2 °F (36.8 °C)  Pulse:  [77-90] 83  Resp:  [16-18] 16  SpO2:  [95 %-97 %] 95 %  BP: (117-155)/(59-74) 141/64     Weight: 78 kg (172 lb)  Body mass index is 28.62 kg/m².    Intake/Output Summary (Last 24 hours) at 11/5/2022 1702  Last data filed at 11/5/2022 0344  Gross per 24 hour   Intake 240 ml   Output --   Net 240 ml        Physical Exam  Constitutional:       General: She is not in acute distress.  HENT:      Head: Atraumatic.   Eyes:      Conjunctiva/sclera: Conjunctivae normal.   Cardiovascular:      Rate and Rhythm: Normal rate and regular rhythm.      Heart sounds: Normal heart sounds. No murmur heard.  Pulmonary:      Effort: Pulmonary effort is normal.      Breath sounds: Normal breath sounds. No wheezing.   Abdominal:      General: Bowel sounds are normal. There is no distension.      Palpations: Abdomen is soft.      Tenderness: There is no abdominal tenderness.   Musculoskeletal:         General: Tenderness present. No deformity. Normal range of motion.      Cervical back: Neck supple.      Comments: Left hip tender from left buttocks which radiates to pelvis.   Neurological:      Mental Status: She is alert  and oriented to person, place, and time.       Significant Labs: All pertinent labs within the past 24 hours have been reviewed.    Significant Imaging: I have reviewed all pertinent imaging results/findings within the past 24 hours.        Assessment/Plan:      * Gram-positive bacteremia  Patient with elevated white count, inflammatory markers including elevated procalcitonin.  Patient with 2/2 positive blood cultures with gram positive cocci.  Improving with antibiotics.  Continue antibiotic therapy with increased dose of ceftriaxone.  Source unclear.  Despite chest radiograph findings patient's clinical picture not consistent with pneumonia.  Concern for hip infection however MRI not clear for infection.  Consulted orthopedic surgery who do not feel there is evidence of hip infection.  ID to evaluate and recommend further imaging to evaluate for possible psoas abscess.  Discussed radiology recommending CT abdomen pelvis with intravenous contrast as the best modality to evaluate further.  CT of the abdomen pelvis is not reveal evidence of psoas abscess or other fluid collection concerning for abscess.  Continue intravenous ceftriaxone.    Hip pain, acute, left  No history of trauma.  Plain film unremarkable. Non-specific inflammatory markers elevated. Follow-up MRI concerning for inflammation of gluteus minimus and tendinosis with likely partial tear but without fracture, infiltrative process.  Continue therapy and pain control.  Consulted orthopedic surgery who do not feel hip is source of infection and recommending NSAIDs, therapy, and possible bursa injection if no improvement.  Acute kidney injury resolved.  Short course of NSAIDs recommended.  Kidney function stable.  Continue to closely monitor kidney function.    Hypertension  Normotensive.    Pulmonary nodules  CT scan revealed incidental bilateral pulmonary nodules.  Repeat CT surveillance recommended in 3 months per Fleischner society  guidelines.    Transaminitis  Mildly elevated.  Repeat levels tomorrow.      Type 2 diabetes mellitus, without long-term current use of insulin  Reasonably controlled with current regimen.  Will continue with current regimen and continue to monitor.    CLL (chronic lymphocytic leukemia)  Granulomatous Inflammation of the Lung   Patient was diagnosed in 2008.  She is a patient of 's with [facility]. Last appointment was 10/26.  Pt gets a yearly CT chest to monitor lung nodules present since 2009. Pt take (Imbruvica) Ibrutinib daily.      VTE Risk Mitigation (From admission, onward)           Ordered     enoxaparin injection 40 mg  Daily         11/01/22 1520     IP VTE HIGH RISK PATIENT  Once         11/01/22 1520     Place sequential compression device  Until discontinued         11/01/22 1520                    Miah Burns MD  Department of Hospital Medicine   [facility] - Med Surg [facility])

## 2022-11-05 NOTE — ASSESSMENT & PLAN NOTE
CT scan revealed incidental bilateral pulmonary nodules.  Repeat CT surveillance recommended in 3 months per Fleischner society guidelines.

## 2022-11-06 LAB
ANION GAP SERPL CALC-SCNC: 8 MMOL/L (ref 8–16)
BASOPHILS # BLD AUTO: 0.04 K/UL (ref 0–0.2)
BASOPHILS NFR BLD: 0.5 % (ref 0–1.9)
BUN SERPL-MCNC: 10 MG/DL (ref 8–23)
CALCIUM SERPL-MCNC: 9.7 MG/DL (ref 8.7–10.5)
CHLORIDE SERPL-SCNC: 103 MMOL/L (ref 95–110)
CO2 SERPL-SCNC: 29 MMOL/L (ref 23–29)
CREAT SERPL-MCNC: 0.6 MG/DL (ref 0.5–1.4)
DIFFERENTIAL METHOD: ABNORMAL
EOSINOPHIL # BLD AUTO: 0.1 K/UL (ref 0–0.5)
EOSINOPHIL NFR BLD: 1.5 % (ref 0–8)
ERYTHROCYTE [DISTWIDTH] IN BLOOD BY AUTOMATED COUNT: 13.9 % (ref 11.5–14.5)
EST. GFR  (NO RACE VARIABLE): >60 ML/MIN/1.73 M^2
GLUCOSE SERPL-MCNC: 142 MG/DL (ref 70–110)
HCT VFR BLD AUTO: 29.5 % (ref 37–48.5)
HGB BLD-MCNC: 9.6 G/DL (ref 12–16)
IMM GRANULOCYTES # BLD AUTO: 0.11 K/UL (ref 0–0.04)
IMM GRANULOCYTES NFR BLD AUTO: 1.3 % (ref 0–0.5)
LYMPHOCYTES # BLD AUTO: 1.5 K/UL (ref 1–4.8)
LYMPHOCYTES NFR BLD: 17.8 % (ref 18–48)
MAGNESIUM SERPL-MCNC: 1.9 MG/DL (ref 1.6–2.6)
MCH RBC QN AUTO: 23.6 PG (ref 27–31)
MCHC RBC AUTO-ENTMCNC: 32.5 G/DL (ref 32–36)
MCV RBC AUTO: 73 FL (ref 82–98)
MONOCYTES # BLD AUTO: 0.7 K/UL (ref 0.3–1)
MONOCYTES NFR BLD: 8.4 % (ref 4–15)
NEUTROPHILS # BLD AUTO: 6 K/UL (ref 1.8–7.7)
NEUTROPHILS NFR BLD: 70.5 % (ref 38–73)
NRBC BLD-RTO: 0 /100 WBC
PHOSPHATE SERPL-MCNC: 3.7 MG/DL (ref 2.7–4.5)
PLATELET # BLD AUTO: 304 K/UL (ref 150–450)
PMV BLD AUTO: 12.3 FL (ref 9.2–12.9)
POCT GLUCOSE: 109 MG/DL (ref 70–110)
POCT GLUCOSE: 131 MG/DL (ref 70–110)
POCT GLUCOSE: 156 MG/DL (ref 70–110)
POCT GLUCOSE: 182 MG/DL (ref 70–110)
POTASSIUM SERPL-SCNC: 3.6 MMOL/L (ref 3.5–5.1)
RBC # BLD AUTO: 4.06 M/UL (ref 4–5.4)
SODIUM SERPL-SCNC: 140 MMOL/L (ref 136–145)
WBC # BLD AUTO: 8.48 K/UL (ref 3.9–12.7)

## 2022-11-06 PROCEDURE — 84100 ASSAY OF PHOSPHORUS: CPT | Performed by: HOSPITALIST

## 2022-11-06 PROCEDURE — 99233 SBSQ HOSP IP/OBS HIGH 50: CPT | Mod: ,,, | Performed by: HOSPITALIST

## 2022-11-06 PROCEDURE — 25000003 PHARM REV CODE 250: Performed by: PHYSICIAN ASSISTANT

## 2022-11-06 PROCEDURE — 36415 COLL VENOUS BLD VENIPUNCTURE: CPT | Performed by: HOSPITALIST

## 2022-11-06 PROCEDURE — 11000001 HC ACUTE MED/SURG PRIVATE ROOM

## 2022-11-06 PROCEDURE — 63600175 PHARM REV CODE 636 W HCPCS: Performed by: HOSPITALIST

## 2022-11-06 PROCEDURE — 85025 COMPLETE CBC W/AUTO DIFF WBC: CPT | Performed by: HOSPITALIST

## 2022-11-06 PROCEDURE — 99233 PR SUBSEQUENT HOSPITAL CARE,LEVL III: ICD-10-PCS | Mod: ,,, | Performed by: HOSPITALIST

## 2022-11-06 PROCEDURE — 63600175 PHARM REV CODE 636 W HCPCS: Performed by: NURSE PRACTITIONER

## 2022-11-06 PROCEDURE — 83735 ASSAY OF MAGNESIUM: CPT | Performed by: HOSPITALIST

## 2022-11-06 PROCEDURE — 25000003 PHARM REV CODE 250: Performed by: NURSE PRACTITIONER

## 2022-11-06 PROCEDURE — 80048 BASIC METABOLIC PNL TOTAL CA: CPT | Performed by: HOSPITALIST

## 2022-11-06 PROCEDURE — 25000003 PHARM REV CODE 250: Performed by: HOSPITALIST

## 2022-11-06 RX ADMIN — CELECOXIB 100 MG: 100 CAPSULE ORAL at 10:11

## 2022-11-06 RX ADMIN — THERA TABS 1 TABLET: TAB at 09:11

## 2022-11-06 RX ADMIN — LIDOCAINE 1 PATCH: 50 PATCH CUTANEOUS at 11:11

## 2022-11-06 RX ADMIN — CEFTRIAXONE 2 G: 2 INJECTION, SOLUTION INTRAVENOUS at 08:11

## 2022-11-06 RX ADMIN — TIMOLOL MALEATE 1 DROP: 2.5 SOLUTION/ DROPS OPHTHALMIC at 08:11

## 2022-11-06 RX ADMIN — GABAPENTIN 200 MG: 100 CAPSULE ORAL at 09:11

## 2022-11-06 RX ADMIN — OXYCODONE 5 MG: 5 TABLET ORAL at 04:11

## 2022-11-06 RX ADMIN — CELECOXIB 100 MG: 100 CAPSULE ORAL at 09:11

## 2022-11-06 RX ADMIN — TIMOLOL MALEATE 1 DROP: 2.5 SOLUTION/ DROPS OPHTHALMIC at 09:11

## 2022-11-06 RX ADMIN — OXYCODONE 5 MG: 5 TABLET ORAL at 09:11

## 2022-11-06 RX ADMIN — POLYETHYLENE GLYCOL 3350 17 G: 17 POWDER, FOR SOLUTION ORAL at 09:11

## 2022-11-06 RX ADMIN — GABAPENTIN 200 MG: 100 CAPSULE ORAL at 08:11

## 2022-11-06 RX ADMIN — ENOXAPARIN SODIUM 40 MG: 100 INJECTION SUBCUTANEOUS at 09:11

## 2022-11-06 RX ADMIN — OXYCODONE 5 MG: 5 TABLET ORAL at 08:11

## 2022-11-06 NOTE — PLAN OF CARE
"Brief id note    Pt not seen today, but chart reviewed- her bcx cleared and ortho doesn't think hip infected. Ct with "left retroperitoneal stranding/inflammatory change extending along the left iliopsoas muscle, of uncertain etiology."    I'm concerned she may have a developing L psoas abscess.     Continue ceftriaxone while inpatient. On discharge, would transition to po levaquin 750mg daily with plan for approx 3-4 weeks of abx (treating strep pneumo bacteremia and possible developing psoas abscess). Would repeat CT of L psoas prior to stopping antibiotics.     Would consider imaging of where prior port was to eval for fluid collection/signs of infection (pt reports retained hardware)    Discussed with hospitalist    Please discuss side effects of quinolones with patient before d/c (including but not limited to qtc prolongation, hypoglycemia, achilles tendon rupture, worsening of aneurysms, c.diff) and make sure these are acceptable risks and preferable to IV medication and risk of picc line complications. Qtc = 449. Avoid milk products within 2 hours      Follow-up appointment will be arranged by the ID clinic and will be found in the patient's appointments tab.    Prior to discharge, please ensure the patient's follow-up has been scheduled.    If there is still no follow-up scheduled prior to discharge, please send an EPIC message to Jeanette Hayes in Infectious Diseases.            "

## 2022-11-06 NOTE — SUBJECTIVE & OBJECTIVE
Interval History: No acute events overnight.  Hip pain improving.    Review of Systems   Constitutional:  Negative for chills and fever.   Respiratory:  Negative for shortness of breath.    Cardiovascular:  Negative for chest pain.   Gastrointestinal:  Negative for abdominal pain, nausea and vomiting.   Genitourinary:  Negative for dysuria and frequency.   Musculoskeletal:  Positive for arthralgias.     Objective:     Vital Signs (Most Recent):  Temp: 98.3 °F (36.8 °C) (11/06/22 1316)  Pulse: 84 (11/06/22 1316)  Resp: 16 (11/06/22 1316)  BP: (!) 144/70 (11/06/22 1316)  SpO2: (!) 93 % (11/06/22 1316)   Vital Signs (24h Range):  Temp:  [98.2 °F (36.8 °C)-99.7 °F (37.6 °C)] 98.3 °F (36.8 °C)  Pulse:  [83-92] 84  Resp:  [16-18] 16  SpO2:  [93 %-97 %] 93 %  BP: (132-162)/(63-72) 144/70     Weight: 78 kg (172 lb)  Body mass index is 28.62 kg/m².  No intake or output data in the 24 hours ending 11/06/22 1422     Physical Exam  Constitutional:       General: She is not in acute distress.  HENT:      Head: Atraumatic.   Eyes:      Conjunctiva/sclera: Conjunctivae normal.   Cardiovascular:      Rate and Rhythm: Normal rate and regular rhythm.      Heart sounds: Normal heart sounds. No murmur heard.  Pulmonary:      Effort: Pulmonary effort is normal.      Breath sounds: Normal breath sounds. No wheezing.   Abdominal:      General: Bowel sounds are normal. There is no distension.      Palpations: Abdomen is soft.      Tenderness: There is no abdominal tenderness.   Musculoskeletal:         General: Tenderness present. No deformity. Normal range of motion.      Cervical back: Neck supple.      Comments: Left hip tender from left buttocks which radiates to pelvis.   Neurological:      Mental Status: She is alert and oriented to person, place, and time.       Significant Labs: All pertinent labs within the past 24 hours have been reviewed.    Significant Imaging: I have reviewed all pertinent imaging results/findings within the  past 24 hours.

## 2022-11-06 NOTE — PROGRESS NOTES
"[facility], [place], operated by [facility] Medicine  Progress Note    Patient Name: Brigitte Sorto  MRN: [id number]  Patient Class: IP- Inpatient   Admission Date: 11/1/2022  Length of Stay: 5 days  Attending Physician: Miah Burns MD  Primary Care Provider: Ace Kyle MD        Subjective:     Principal Problem:Gram-positive bacteremia        HPI:  Per Rosario Prado, DNP:    "Ms. Briggs is 62-year-old female with a past medical history of chronic lymphocyte leukemia, diabetes type 2, and hypertension.  She came to the emergency department for left hip pain that began 1 month ago.  Patient describes the pain as sharp, intermittent, and worse with weight-bearing movement.  It radiates around her but to the front of her pelvic area.  It does come and go.  She has had some pain free days.  Patient has been taking about 4 Tylenol a day for the past month and has tried a lidocaine patch.  She has visited the ED multiple times for the same complaint.  Hip radiographs have been unrevealing.  Patient denies any trauma, lower abdominal pain, back or neck pain. While in the ED, patient was hypotensive.  Her blood work found elevated white blood cell count over 17 along with the Procalcitonin of 8.  Patient also had an acute kidney injury with a creatinine of 1.7.  She had elevated liver enzymes as well.  Sepsis protocol was initiated.  Patient admitted to hospital medicine for further management."      Overview/Hospital Course:  Patient is a 62 year-old woman with hypertension, diabetes mellitus type II, chronic lymphocyte leukemia who presented worsening/persistent left hip pain.  Lab studies show elevated inflammatory markers.  Plain film unremarkable. Follow-up MRI  of the left hip negative for fracture, marrow infiltrative process, or abscess and revealed inflammation of gluteus minimus and medius with likely partial tear.  Patient also presents with evidence of acute kidney injury which is improving with " volume resuscitation.  Blood cultures positive for Streptococcus pneumoniae.   Source of bacteremia unclear.  ID consulted.      Interval History: No acute events overnight.  Hip pain improving.    Review of Systems   Constitutional:  Negative for chills and fever.   Respiratory:  Negative for shortness of breath.    Cardiovascular:  Negative for chest pain.   Gastrointestinal:  Negative for abdominal pain, nausea and vomiting.   Genitourinary:  Negative for dysuria and frequency.   Musculoskeletal:  Positive for arthralgias.     Objective:     Vital Signs (Most Recent):  Temp: 98.3 °F (36.8 °C) (11/06/22 1316)  Pulse: 84 (11/06/22 1316)  Resp: 16 (11/06/22 1316)  BP: (!) 144/70 (11/06/22 1316)  SpO2: (!) 93 % (11/06/22 1316)   Vital Signs (24h Range):  Temp:  [98.2 °F (36.8 °C)-99.7 °F (37.6 °C)] 98.3 °F (36.8 °C)  Pulse:  [83-92] 84  Resp:  [16-18] 16  SpO2:  [93 %-97 %] 93 %  BP: (132-162)/(63-72) 144/70     Weight: 78 kg (172 lb)  Body mass index is 28.62 kg/m².  No intake or output data in the 24 hours ending 11/06/22 1422     Physical Exam  Constitutional:       General: She is not in acute distress.  HENT:      Head: Atraumatic.   Eyes:      Conjunctiva/sclera: Conjunctivae normal.   Cardiovascular:      Rate and Rhythm: Normal rate and regular rhythm.      Heart sounds: Normal heart sounds. No murmur heard.  Pulmonary:      Effort: Pulmonary effort is normal.      Breath sounds: Normal breath sounds. No wheezing.   Abdominal:      General: Bowel sounds are normal. There is no distension.      Palpations: Abdomen is soft.      Tenderness: There is no abdominal tenderness.   Musculoskeletal:         General: Tenderness present. No deformity. Normal range of motion.      Cervical back: Neck supple.      Comments: Left hip tender from left buttocks which radiates to pelvis.   Neurological:      Mental Status: She is alert and oriented to person, place, and time.       Significant Labs: All pertinent labs within  the past 24 hours have been reviewed.    Significant Imaging: I have reviewed all pertinent imaging results/findings within the past 24 hours.        Assessment/Plan:      * Gram-positive bacteremia  Patient with elevated white count, inflammatory markers including elevated procalcitonin.  Patient with 2/2 positive blood cultures with gram positive cocci.  Improving with antibiotics.  Continue antibiotic therapy with increased dose of ceftriaxone.  Source unclear.  Despite chest radiograph findings patient's clinical picture not consistent with pneumonia.  Concern for hip infection however MRI not clear for infection.  Consulted orthopedic surgery who do not feel there is evidence of hip infection.  ID to evaluate and recommend further imaging to evaluate for possible psoas abscess.  Discussed radiology recommending CT abdomen pelvis with intravenous contrast as the best modality to evaluate further.  CT of the abdomen pelvis is not reveal evidence of psoas abscess or other fluid collection concerning for abscess.  Continue intravenous ceftriaxone.    Hip pain, acute, left  No history of trauma.  Plain film unremarkable. Non-specific inflammatory markers elevated. Follow-up MRI concerning for inflammation of gluteus minimus and tendinosis with likely partial tear but without fracture, infiltrative process.  Continue therapy and pain control.  Consulted orthopedic surgery who do not feel hip is source of infection and recommending NSAIDs, therapy, and possible bursa injection if no improvement.  Acute kidney injury resolved.  Short course of NSAIDs recommended.  Kidney function stable.  Continue to closely monitor kidney function.    Hypertension  Normotensive.    Pulmonary nodules  CT scan revealed incidental bilateral pulmonary nodules.  Repeat CT surveillance recommended in 3 months per Fleischner society guidelines.    Transaminitis  Mildly elevated.  Repeat levels tomorrow.      Type 2 diabetes mellitus, without  long-term current use of insulin  Reasonably controlled with current regimen.  Will continue with current regimen and continue to monitor.    CLL (chronic lymphocytic leukemia)  Granulomatous Inflammation of the Lung   Patient was diagnosed in 2008.  She is a patient of 's with [facility]. Last appointment was 10/26.  Pt gets a yearly CT chest to monitor lung nodules present since 2009. Pt take (Imbruvica) Ibrutinib daily.        VTE Risk Mitigation (From admission, onward)         Ordered     enoxaparin injection 40 mg  Daily         11/01/22 1520     IP VTE HIGH RISK PATIENT  Once         11/01/22 1520     Place sequential compression device  Until discontinued         11/01/22 1520                  Miah Burns MD  Department of Hospital Medicine   [facility] - [facility])

## 2022-11-07 VITALS
SYSTOLIC BLOOD PRESSURE: 144 MMHG | BODY MASS INDEX: 28.66 KG/M2 | TEMPERATURE: 99 F | WEIGHT: 172 LBS | HEIGHT: 65 IN | RESPIRATION RATE: 18 BRPM | DIASTOLIC BLOOD PRESSURE: 66 MMHG | HEART RATE: 88 BPM | OXYGEN SATURATION: 97 %

## 2022-11-07 LAB
ANION GAP SERPL CALC-SCNC: 9 MMOL/L (ref 8–16)
BASOPHILS # BLD AUTO: 0.03 K/UL (ref 0–0.2)
BASOPHILS NFR BLD: 0.4 % (ref 0–1.9)
BUN SERPL-MCNC: 8 MG/DL (ref 8–23)
CALCIUM SERPL-MCNC: 9.2 MG/DL (ref 8.7–10.5)
CHLORIDE SERPL-SCNC: 104 MMOL/L (ref 95–110)
CO2 SERPL-SCNC: 28 MMOL/L (ref 23–29)
CREAT SERPL-MCNC: 0.6 MG/DL (ref 0.5–1.4)
DIFFERENTIAL METHOD: ABNORMAL
EOSINOPHIL # BLD AUTO: 0.2 K/UL (ref 0–0.5)
EOSINOPHIL NFR BLD: 2.6 % (ref 0–8)
ERYTHROCYTE [DISTWIDTH] IN BLOOD BY AUTOMATED COUNT: 14 % (ref 11.5–14.5)
EST. GFR  (NO RACE VARIABLE): >60 ML/MIN/1.73 M^2
GLUCOSE SERPL-MCNC: 116 MG/DL (ref 70–110)
HCT VFR BLD AUTO: 28.9 % (ref 37–48.5)
HGB BLD-MCNC: 9.2 G/DL (ref 12–16)
IMM GRANULOCYTES # BLD AUTO: 0.13 K/UL (ref 0–0.04)
IMM GRANULOCYTES NFR BLD AUTO: 1.6 % (ref 0–0.5)
LYMPHOCYTES # BLD AUTO: 2.2 K/UL (ref 1–4.8)
LYMPHOCYTES NFR BLD: 27.2 % (ref 18–48)
MAGNESIUM SERPL-MCNC: 1.9 MG/DL (ref 1.6–2.6)
MCH RBC QN AUTO: 23.5 PG (ref 27–31)
MCHC RBC AUTO-ENTMCNC: 31.8 G/DL (ref 32–36)
MCV RBC AUTO: 74 FL (ref 82–98)
MONOCYTES # BLD AUTO: 0.8 K/UL (ref 0.3–1)
MONOCYTES NFR BLD: 9.5 % (ref 4–15)
NEUTROPHILS # BLD AUTO: 4.7 K/UL (ref 1.8–7.7)
NEUTROPHILS NFR BLD: 58.7 % (ref 38–73)
NRBC BLD-RTO: 0 /100 WBC
PHOSPHATE SERPL-MCNC: 3.7 MG/DL (ref 2.7–4.5)
PLATELET # BLD AUTO: 346 K/UL (ref 150–450)
PMV BLD AUTO: 11.6 FL (ref 9.2–12.9)
POCT GLUCOSE: 100 MG/DL (ref 70–110)
POCT GLUCOSE: 172 MG/DL (ref 70–110)
POTASSIUM SERPL-SCNC: 3.9 MMOL/L (ref 3.5–5.1)
RBC # BLD AUTO: 3.92 M/UL (ref 4–5.4)
SODIUM SERPL-SCNC: 141 MMOL/L (ref 136–145)
WBC # BLD AUTO: 8.01 K/UL (ref 3.9–12.7)

## 2022-11-07 PROCEDURE — 83735 ASSAY OF MAGNESIUM: CPT | Performed by: HOSPITALIST

## 2022-11-07 PROCEDURE — 80048 BASIC METABOLIC PNL TOTAL CA: CPT | Performed by: HOSPITALIST

## 2022-11-07 PROCEDURE — 97116 GAIT TRAINING THERAPY: CPT | Mod: CQ

## 2022-11-07 PROCEDURE — 36415 COLL VENOUS BLD VENIPUNCTURE: CPT | Performed by: HOSPITALIST

## 2022-11-07 PROCEDURE — 93010 ELECTROCARDIOGRAM REPORT: CPT | Mod: ,,, | Performed by: INTERNAL MEDICINE

## 2022-11-07 PROCEDURE — 93005 ELECTROCARDIOGRAM TRACING: CPT

## 2022-11-07 PROCEDURE — 99239 HOSP IP/OBS DSCHRG MGMT >30: CPT | Mod: ,,, | Performed by: HOSPITALIST

## 2022-11-07 PROCEDURE — 25000003 PHARM REV CODE 250: Performed by: HOSPITALIST

## 2022-11-07 PROCEDURE — 93010 EKG 12-LEAD: ICD-10-PCS | Mod: ,,, | Performed by: INTERNAL MEDICINE

## 2022-11-07 PROCEDURE — 84100 ASSAY OF PHOSPHORUS: CPT | Performed by: HOSPITALIST

## 2022-11-07 PROCEDURE — 99900035 HC TECH TIME PER 15 MIN (STAT)

## 2022-11-07 PROCEDURE — 85025 COMPLETE CBC W/AUTO DIFF WBC: CPT | Performed by: HOSPITALIST

## 2022-11-07 PROCEDURE — 63600175 PHARM REV CODE 636 W HCPCS: Performed by: NURSE PRACTITIONER

## 2022-11-07 PROCEDURE — 25000003 PHARM REV CODE 250: Performed by: NURSE PRACTITIONER

## 2022-11-07 PROCEDURE — 93041 RHYTHM ECG TRACING: CPT

## 2022-11-07 PROCEDURE — 99239 PR HOSPITAL DISCHARGE DAY,>30 MIN: ICD-10-PCS | Mod: ,,, | Performed by: HOSPITALIST

## 2022-11-07 PROCEDURE — 25000003 PHARM REV CODE 250: Performed by: PHYSICIAN ASSISTANT

## 2022-11-07 RX ORDER — GABAPENTIN 100 MG/1
100 CAPSULE ORAL 3 TIMES DAILY
Qty: 90 CAPSULE | Refills: 11 | Status: SHIPPED | OUTPATIENT
Start: 2022-11-07 | End: 2023-11-07

## 2022-11-07 RX ORDER — LOSARTAN POTASSIUM 25 MG/1
25 TABLET ORAL DAILY
Qty: 30 TABLET | Refills: 0 | Status: SHIPPED | OUTPATIENT
Start: 2022-11-07

## 2022-11-07 RX ORDER — POLYETHYLENE GLYCOL 3350 17 G/17G
17 POWDER, FOR SOLUTION ORAL DAILY
Qty: 510 G | Refills: 0 | Status: SHIPPED | OUTPATIENT
Start: 2022-11-07

## 2022-11-07 RX ORDER — OXYCODONE HYDROCHLORIDE 5 MG/1
5 TABLET ORAL EVERY 8 HOURS PRN
Qty: 21 TABLET | Refills: 0 | Status: SHIPPED | OUTPATIENT
Start: 2022-11-07 | End: 2022-11-14

## 2022-11-07 RX ORDER — LEVOFLOXACIN 750 MG/1
750 TABLET ORAL NIGHTLY
Qty: 21 TABLET | Refills: 0 | Status: SHIPPED | OUTPATIENT
Start: 2022-11-07 | End: 2022-11-28

## 2022-11-07 RX ORDER — ACETAMINOPHEN 500 MG
1000 TABLET ORAL EVERY 8 HOURS PRN
Refills: 0 | COMMUNITY
Start: 2022-11-07

## 2022-11-07 RX ADMIN — GABAPENTIN 200 MG: 100 CAPSULE ORAL at 09:11

## 2022-11-07 RX ADMIN — CELECOXIB 100 MG: 100 CAPSULE ORAL at 09:11

## 2022-11-07 RX ADMIN — OXYCODONE 5 MG: 5 TABLET ORAL at 09:11

## 2022-11-07 RX ADMIN — ENOXAPARIN SODIUM 40 MG: 100 INJECTION SUBCUTANEOUS at 09:11

## 2022-11-07 RX ADMIN — TIMOLOL MALEATE 1 DROP: 2.5 SOLUTION/ DROPS OPHTHALMIC at 09:11

## 2022-11-07 RX ADMIN — THERA TABS 1 TABLET: TAB at 09:11

## 2022-11-07 RX ADMIN — LIDOCAINE 1 PATCH: 50 PATCH CUTANEOUS at 12:11

## 2022-11-07 NOTE — PLAN OF CARE
Problem: Physical Therapy  Goal: Physical Therapy Goal  Description: Goals to be met by: 22    Patient will increase functional independence with mobility by performin. Sit<>stand with LRAD with SPV. - MET 22  2. Gait x 100 feet with LRAD with SBA. - MET 22  3. Ascend/descend 3 step(s) with least restrictive assistive device and SBA.      Sit>stand with RW and SPV  Amb 100' with RW and SBA, no LoB  Sit>supine with mod(I)  Scoot toward HOB with independence  Pt with good mobility in spite of L hip pain; pt progressing well toward meeting goals  Rec HHPT  Outcome: Ongoing, Progressing

## 2022-11-07 NOTE — PT/OT/SLP PROGRESS
Physical Therapy Treatment    Patient Name:  Brigitte Sorto   MRN:  [id number]    Recommendations:     Discharge Recommendations:  home health PT, home health OT   Discharge Equipment Recommendations: walker, rolling   Barriers to discharge: Inaccessible home and Decreased caregiver support    Assessment:     Brigitte Sorto is a 62 y.o. female admitted with a medical diagnosis of Gram-positive bacteremia.  She presents with the following impairments/functional limitations:  pain, gait instability, impaired balance, impaired endurance, impaired functional mobility, impaired self care skills, decreased safety awareness.    Sit>stand with RW and SPV  Amb 100' with RW and SBA, no LoB  Sit>supine with mod(I)  Scoot toward HOB with independence  Pt with good mobility in spite of L hip pain; pt progressing well toward meeting goals  Rec HHPT    Rehab Prognosis: Good; patient would benefit from acute skilled PT services to address these deficits and reach maximum level of function.    Recent Surgery: * No surgery found *      Plan:     During this hospitalization, patient to be seen 3 x/week to address the identified rehab impairments via gait training, therapeutic activities, therapeutic exercises, neuromuscular re-education and progress toward the following goals:    Plan of Care Expires:  12/01/22    Subjective     Chief Complaint: pain   Patient/Family Comments/goals: pt agreeable to therapy  Pain/Comfort:  Pain Rating 1: other (see comments) (unrated pain in back)  Location - Side 1: Bilateral  Location - Orientation 1: medial  Location 1: back  Pain Addressed 1: Reposition, Distraction, Cessation of Activity  Pain Rating Post-Intervention 1: 0/10  Pain Rating 2: 0/10  Location - Side 2: Left  Location 2: hip  Pain Addressed 2: Reposition, Distraction, Cessation of Activity  Pain Rating Post-Intervention 2: other (see comments) (unrated pain with ambulation)      Objective:     Communicated with nurse Mariee prior to  session.  Patient found up in chair with peripheral IV upon PT entry to room.     General Precautions: Standard, fall, diabetic   Orthopedic Precautions:N/A   Braces:    Respiratory Status: Room air     Functional Mobility:  Bed Mobility:     Scooting: independence  Sit to Supine: modified independence  Transfers:     Sit to Stand:  supervision with rolling walker  Gait: Amb 100' with RW and SBA, no LoB      AM-PAC 6 CLICK MOBILITY  Turning over in bed (including adjusting bedclothes, sheets and blankets)?: 3  Sitting down on and standing up from a chair with arms (e.g., wheelchair, bedside commode, etc.): 3  Moving from lying on back to sitting on the side of the bed?: 3  Moving to and from a bed to a chair (including a wheelchair)?: 3  Need to walk in hospital room?: 3  Climbing 3-5 steps with a railing?: 2  Basic Mobility Total Score: 17       Treatment & Education:  Seated therex: LAQ, heel raises, scapular retractions, lumbo-thoracic-cervical rotations x 10  Gait training as noted    Patient left HOB elevated with all lines intact, call button in reach, and EKG tech present..    GOALS:   Multidisciplinary Problems       Physical Therapy Goals          Problem: Physical Therapy    Goal Priority Disciplines Outcome Goal Variances Interventions   Physical Therapy Goal     PT, PT/OT Ongoing, Progressing     Description: Goals to be met by: 22    Patient will increase functional independence with mobility by performin. Sit<>stand with LRAD with SPV. - MET 22  2. Gait x 100 feet with LRAD with SBA. - MET 22  3. Ascend/descend 3 step(s) with least restrictive assistive device and SBA.                           Time Tracking:     PT Received On: 22  PT Start Time: 1428     PT Stop Time: 1440  PT Total Time (min): 12 min     Billable Minutes: Gait Training 12    Treatment Type: Treatment  PT/PTA: PTA     PTA Visit Number: 2     2022

## 2022-11-07 NOTE — PLAN OF CARE
Pt ready for discharge home. Home health arrangements are in process with [facility] choice of [facility].    Pt confirmed that she has adequate transportation home.   RW delivered to pt prior to discharge.     Pt is ready for discharge from Cm perspective.   11/07/22 1732   Final Note   Assessment Type Final Discharge Note   Anticipated Discharge Disposition Home-Health   What phone number can be called within the next 1-3 days to see how you are doing after discharge? [phone]   Hospital Resources/Appts/Education Provided Provided patient/caregiver with written discharge plan information;Provided education on problems/symptoms using teachback;Appointments scheduled and added to AVS   Post-Acute Status   Post-Acute Authorization Home Health   Home Health Status Referrals Sent   Patient choice form signed by patient/caregiver List with quality metrics by geographic area provided;List from CMS Compare;List from System Post-Acute Care   Discharge Delays None known at this time   [facility] ([facility])  Discharge Final Note    Primary Care Provider: Ace Kyle MD    Expected Discharge Date: 11/7/2022    Final Discharge Note (most recent)       Final Note - 11/07/22 1732          Final Note    Assessment Type Final Discharge Note (P)      Anticipated Discharge Disposition Home-Health Care Svc (P)      What phone number can be called within the next 1-3 days to see how you are doing after discharge? [phone] (P)      Hospital Resources/Appts/Education Provided Provided patient/caregiver with written discharge plan information;Provided education on problems/symptoms using teachback;Appointments scheduled and added to AVS (P)         Post-Acute Status    Post-Acute Authorization Home Health (P)      Home Health Status Referrals Sent (P)      Patient choice form signed by patient/caregiver List with quality metrics by geographic area provided;List from CMS Compare;List from System Post-Acute Care (P)       Discharge Delays None known at this time (P)                      Important Message from Medicare  Important Message from Medicare regarding Discharge Appeal Rights: Given to patient/caregiver, Explained to patient/caregiver, Signed/date by patient/caregiver     Date IMM was signed: 11/04/22  Time IMM was signed: 1037    Contact Info       Rosalio Ndiaye MD   Specialty: Orthopedic Surgery    [address] [facility] [address]   Phone: [phone]       Next Steps: Schedule an appointment as soon as possible for a visit in 2 week(s)    Instructions: Left hip pain    Amisha Cantu MD   Specialty: Infectious Diseases    [address] [facility] [address]   Phone: [phone]       Next Steps: Schedule an appointment as soon as possible for a visit in 1 week(s)    Ace Kyle MD   Specialty: Family Medicine   Relationship: PCP - General    [address] [place] [address]   Phone: [phone]       Next Steps: Schedule an appointment as soon as possible for a visit in 1 week(s)    Instructions: Management of medical comorbodites

## 2022-11-07 NOTE — PLAN OF CARE
[facility] ([facility])  HOME HEALTH ORDERS  FACE TO FACE ENCOUNTER    Patient Name: Brigitte Sorto  YOB: 1960  PCP: Ace Kyle MD   PCP Address: [address] [place] [address]  PCP Phone Number: [phone]  PCP Fax: [phone]    Encounter Date: 11/1/22    Admit to Home Health    Diagnoses:  Active Hospital Problems    Diagnosis  POA    *Gram-positive bacteremia [R78.81]  Yes     Priority: 1 - High    Hip pain, acute, left [M25.552]  Yes     Priority: 3     Hypertension [I10]  Yes     Priority: 9     Pulmonary nodules [R91.8]  Yes    Type 2 diabetes mellitus, without long-term current use of insulin [E11.9]  Yes    Transaminitis [R74.01]  Yes    CLL (chronic lymphocytic leukemia) [C91.10]  Yes      Resolved Hospital Problems    Diagnosis Date Resolved POA    Acute kidney injury [N17.9] 11/03/2022 Yes     Priority: 4     Hypokalemia [E87.6] 11/05/2022 Yes    Pneumonia [J18.9] 11/02/2022 Yes       Follow Up Appointments:  Future Appointments   Date Time Provider Department Center   12/14/2022  1:30 PM Amisha Cantu MD [facility] [address]       Allergies:  Review of patient's allergies indicates:   Allergen Reactions    Amoxicillin        Medications: Review discharge medications with patient and family and provide education.      Current Discharge Medication List        START taking these medications    Details   acetaminophen (TYLENOL) 500 MG tablet Take 2 tablets (1,000 mg total) by mouth every 8 (eight) hours as needed for Pain.  Refills: 0      gabapentin (NEURONTIN) 100 MG capsule Take 1 capsule (100 mg total) by mouth 3 (three) times daily.  Qty: 90 capsule, Refills: 11      levoFLOXacin (LEVAQUIN) 750 MG tablet Take 1 tablet (750 mg total) by mouth every evening. for 21 days  Qty: 21 tablet, Refills: 0      oxyCODONE (ROXICODONE) 5 MG immediate release tablet Take 1 tablet (5 mg total) by mouth every 8 (eight) hours as needed (Pain not controlled with  acetaminophen).  Qty: 21 tablet, Refills: 0    Comments: Quantity prescribed more than 7 day supply? No      polyethylene glycol (GLYCOLAX) 17 gram PwPk Take 17 g by mouth once daily.  Qty: 30 each, Refills: 0           CONTINUE these medications which have CHANGED    Details   losartan (COZAAR) 25 MG tablet Take 1 tablet (25 mg total) by mouth once daily.  Qty: 30 tablet, Refills: 0           CONTINUE these medications which have NOT CHANGED    Details   IMBRUVICA 420 mg Tab Take 1 tablet by mouth once daily.      multivitamin (THERAGRAN) per tablet Take 1 tablet by mouth once daily.      timolol maleate 0.5% (TIMOPTIC-XE) 0.5 % SolG Place 1 drop into both eyes 2 (two) times daily.           STOP taking these medications       amLODIPine (NORVASC) 5 MG tablet Comments:   Reason for Stopping:         chlorthalidone (HYGROTEN) 25 MG Tab Comments:   Reason for Stopping:                 I have seen and examined this patient within the last 30 days. My clinical findings that support the need for the home health skilled services and home bound status are the following:no   Weakness/numbness causing balance and gait disturbance due to Infection and Weakness/Debility making it taxing to leave home.     Diet:   diabetic diet 2000 calorie    Referrals/ Consults  Physical Therapy to evaluate and treat. Evaluate for home safety and equipment needs; Establish/upgrade home exercise program. Perform / instruct on therapeutic exercises, gait training, transfer training, and Range of Motion.  Occupational Therapy to evaluate and treat. Evaluate home environment for safety and equipment needs. Perform/Instruct on transfers, ADL training, ROM, and therapeutic exercises.   to evaluate for community resources/long-range planning.  Aide to provide assistance with personal care, ADLs, and vital signs.    Activities:   activity as tolerated    Nursing:   Agency to admit patient within 24 hours of hospital discharge unless  specified on physician order or at patient request    SN to complete comprehensive assessment including routine vital signs. Instruct on disease process and s/s of complications to report to MD. Review/verify medication list sent home with the patient at time of discharge  and instruct patient/caregiver as needed. Frequency may be adjusted depending on start of care date.     Skilled nurse to perform up to 3 visits PRN for symptoms related to diagnosis    Notify MD if SBP > 160 or < 90; DBP > 90 or < 50; HR > 120 or < 50; Temp > 101; O2 < 88%    Ok to schedule additional visits based on staff availability and patient request on consecutive days within the home health episode.    For all post-discharge communication and subsequent orders please contact patient's primary care physician.    Home Health Aide:  Physical Therapy Three times weekly and Occupational Therapy Three times weekly    I certify that this patient is confined to her home and needs intermittent skilled nursing care, physical therapy, and occupational therapy.

## 2022-11-07 NOTE — PLAN OF CARE
Problem: Fluid and Electrolyte Imbalance (Acute Kidney Injury/Impairment)  Goal: Fluid and Electrolyte Balance  Outcome: Ongoing, Progressing     Problem: Oral Intake Inadequate (Acute Kidney Injury/Impairment)  Goal: Optimal Nutrition Intake  Outcome: Ongoing, Progressing     Problem: Renal Function Impairment (Acute Kidney Injury/Impairment)  Goal: Effective Renal Function  Outcome: Ongoing, Progressing     Problem: Infection (Pneumonia)  Goal: Resolution of Infection Signs and Symptoms  Outcome: Ongoing, Progressing     Problem: Diabetes Comorbidity  Goal: Blood Glucose Level Within Targeted Range  Outcome: Ongoing, Progressing

## 2022-11-08 LAB — BACTERIA BLD CULT: NORMAL

## 2022-11-08 NOTE — PROGRESS NOTES
Patient discharged from hospital before HERMILAW was able to complete initial visit.     SHU Escobar

## 2022-11-08 NOTE — PHYSICIAN QUERY
PT Name: Brigitte Sorto  MR #: [id number]     DOCUMENTATION CLARIFICATION     CDS/: ERIC Ram, RN, CDS               Contact information:[email or web address]  This form is a permanent document in the medical record.     Query Date: November 8, 2022    By submitting this query, we are merely seeking further clarification of documentation.  Please utilize your independent clinical judgment when addressing the question(s) below.  The Medical Record contains the following:  Indicators Supporting Clinical Findings Location in Medical Record   X HR         RR          BP        Temp Temp:  [98.1 °F (36.7 °C)-100.2 °F (37.9 °C)] 100.2 °F (37.9 °C)  Pulse:  [] 105  Resp:  [17-24] 17  SpO2:  [95 %-98 %] 96 %  BP: ()/(52-70) 148/65    Patient percentage with hypotension, leukocytosis    Temp:  [97.9 °F (36.6 °C)-99.6 °F (37.6 °C)] 99.5 °F (37.5 °C)  Pulse:  [85-98] 98  Resp:  [14-18] 15  SpO2:  [93 %-97 %] 96 %  BP: (114-133)/(53-64) 123/58    Temp:  [98.4 °F (36.9 °C)-99.7 °F (37.6 °C)] 99.7 °F (37.6 °C)  Pulse:  [89-99] 99  Resp:  [15-20] 17  SpO2:  [94 %-96 %] 96 %  BP: (123-160)/(58-75) 160/75   H&PWINTER DNP/Dr. Burns, 11/1                , Dr. Burns, 11/2            , Dr. Burns, 11/3   X Lactic Acid          Procalcitonin Procalcitonin 8.05, lactic acid 1.4, repeat lactic acid 0.9   CARLOS&PWINTER DNP/Dr. Burns, 11/1   X WBC           Bands          CRP     11/01 11/02 11/03 11/04 11/05 11/06   WBC 17.56 (H) 13.75 (H) 14.57 (H) 13.56 (H) 8.89 8.48        Lab 11/1-11/6   X Culture(s) Blood cultures positive for Streptococcus pneumoniae   CATA, Dr. Burns, 11/6    AMS, Confusion, LOC, etc.     X Organ Dysfunction/Failure BAY   H&P, WINTER Prado DNP/Dr. Burns, 11/1   X Bacteremia or Sepsis / Septic Sepsis with acute organ dysfunction    Gram-positive bacteremia- Source unclear    Gram-positive bacteremia-Blood cultures positive for Streptococcus pneumoniae.   Source of  bacteremia unclear   H&PWINTER DNP/Dr. Burns, 11/1    , Dr. Burns, 11/2    , Dr. Burns, 11/6   X Known or Suspected Source of Infection documented Concerned she may have a developing L psoas abscess ID, Dr. Cantu, 11/6    (Failed) Outpatient Treatment      Medication     X Treatment Sepsis protocol was initiated  Patient received Azactam, Flagyl, and vanc in the ED    Improving with antibiotics.  Continue antibiotic therapy with increased dose of ceftriaxone    Continue ceftriaxone while inpatient. On discharge, would transition to po levaquin 750mg daily with plan for approx 3-4 weeks of abx (treating strep pneumo bacteremia and possible developing psoas abscess)   H&PWINTER DNP/Dr. Burns, 11/1      , Dr. Burns, 11/2      ID, Dr. Cantu, 11/6    Other          Provider, please Clarify the Sepsis Diagnosis associated with above clinical findings.    [  x ] Sepsis Ruled In, due to Streptococcus Pneumoniae Bacteremia     [   ] Sepsis Ruled In, due to other source (please specify): __________     [   ] Sepsis Ruled Out     [   ] Other Infectious Disease (please specify): __________     [  ] Clinically Undetermined         Please document in your progress notes daily for the duration of treatment until resolved and include in your discharge summary.

## 2022-11-09 PROCEDURE — G0180 MD CERTIFICATION HHA PATIENT: HCPCS | Mod: ,,, | Performed by: HOSPITALIST

## 2022-11-09 PROCEDURE — G0180 PR HOME HEALTH MD CERTIFICATION: ICD-10-PCS | Mod: ,,, | Performed by: HOSPITALIST

## 2022-11-09 NOTE — DISCHARGE SUMMARY
"[facility] [place] [facility]  Discharge Summary      Patient Name: Brigitte Sorto  MRN: [id number]  [facility]: [id number]  Patient Class: IP- Inpatient  Admission Date: 11/1/2022  Hospital Length of Stay: 6 days  Discharge Date and Time: 11/7/2022  6:18 PM  Attending Physician: Miah Burns MD  Discharging Provider: Miah Burns MD  Primary Care Provider: Ace Kyle MD    HPI:   Guillermo Prado DNP:    "Ms. Briggs is 62-year-old female with a past medical history of chronic lymphocyte leukemia, diabetes type 2, and hypertension.  She came to the emergency department for left hip pain that began 1 month ago.  Patient describes the pain as sharp, intermittent, and worse with weight-bearing movement.  It radiates around her but to the front of her pelvic area.  It does come and go.  She has had some pain free days.  Patient has been taking about 4 Tylenol a day for the past month and has tried a lidocaine patch.  She has visited the ED multiple times for the same complaint.  Hip radiographs have been unrevealing.  Patient denies any trauma, lower abdominal pain, back or neck pain. While in the ED, patient was hypotensive.  Her blood work found elevated white blood cell count over 17 along with the Procalcitonin of 8.  Patient also had an acute kidney injury with a creatinine of 1.7.  She had elevated liver enzymes as well.  Sepsis protocol was initiated.  Patient admitted to hospital medicine for further management."    Hospital Course:   Patient is a 62 year-old woman with hypertension, diabetes mellitus type II, chronic lymphocytic leukemia who presented worsening/persistent left hip pain.  Lab studies show elevated inflammatory markers.  Plain film unremarkable. Follow-up MRI  of the left hip negative for fracture, marrow infiltrative process, or abscess and revealed inflammation of gluteus minimus and medius with likely partial tear.  Patient also presents with evidence of acute " kidney injury which resolved with volume resuscitation.  Blood cultures positive for Streptococcus pneumoniae.   Source of bacteremia unclear.  Infectious Disease and Orthopedic surgery Services consulted.      Orthopedic surgeon (Dr. Rosalio Ndiaye) suspect inflammation seen on imaging was likely related to osteoarthritis and possible bursitis.  Treatment with nonsteroidal anti-inflammatory drugs and physical therapy advised.  Patient was treated with short course of non-steroidal anti-inflammatory drug which improvement.  Kidney function was closely monitored and remained stable.      Infectious Disease specialist (Dr. Amisha Cantu) was concern for possible early developing left psoas muscle abscess.  CT scan of the psoas muscles obtained and did not show fluid collection however recommendation is to treat for possible developing abscess with a prolonged course of oral levofloxacin 750 mg daily for minimum of 3 weeks which would also cover the bacteremia with Streptococcus pneumoniae.  Source of Streptococcus min remains unclear.  Echocardiogram 10 did not show evidence of endocarditis.  Risks of fluoroquinolone therapy discussed with patient.    Patient discharged home stable condition on oral fluoroquinolone therapy and physical therapy with outpatient follow-up with Infectious Disease Clinic.  Patient also advised to follow-up with orthopedic surgery and with her primary care physician ongoing management of medical comorbidities.       Goals of Care Treatment Preferences:  Code Status: Full Code      Consults:   Consults (From admission, onward)        Status Ordering Provider     Inpatient consult to Orthopedic Surgery  Once        Provider:  Hector Briones MD    Completed BOWEN CIFUENTES     Inpatient consult to Infectious Diseases  Once        Provider:  Amisha Cantu MD    Completed BOWEN CIFUENTES            Final Active Diagnoses:    Diagnosis Date Noted POA    PRINCIPAL PROBLEM:  Gram-positive  "bacteremia [R78.81] 11/01/2022 Yes    Hip pain, acute, left [M25.552] 11/01/2022 Yes    Hypertension [I10] 11/01/2022 Yes    Pulmonary nodules [R91.8] 11/05/2022 Yes    Type 2 diabetes mellitus, without long-term current use of insulin [E11.9] 11/01/2022 Yes    Transaminitis [R74.01] 11/01/2022 Yes    CLL (chronic lymphocytic leukemia) [C91.10] 01/26/2015 Yes      Problems Resolved During this Admission:    Diagnosis Date Noted Date Resolved POA    Acute kidney injury [N17.9] 11/01/2022 11/03/2022 Yes    Hypokalemia [E87.6] 11/04/2022 11/05/2022 Yes    Pneumonia [J18.9] 11/01/2022 11/02/2022 Yes       Discharged Condition: Stable    Disposition: Home-Health Care [facility]    Follow Up:   Follow-up Information     Rosalio Ndiaye MD. Schedule an appointment as soon as possible for a visit in 2 week(s).    Specialty: Orthopedic Surgery  Why: Left hip pain  Contact information:  [address] [facility] [address]  [phone]             Amisha Cantu MD. Schedule an appointment as soon as possible for a visit in 1 week(s).    Specialty: Infectious Diseases  Contact information:  [address] [facility] [address]  [phone]             Ace Kyle MD. Schedule an appointment as soon as possible for a visit in 1 week(s).    Specialty: Family Medicine  Why: Management of medical comorbodites  Contact information:  [address] [facility] [address]  [facility] [address] [phone]             [facility] Follow up.    Specialties: Home Health Services, Home Therapy Services, Home Living Aide Services  Contact information:  [address] [place] [address] [phone]                     Patient Instructions:      WALKER FOR HOME USE     Order Specific Question Answer Comments   Type of Walker: Adult (5'4"-6'6")    With wheels? Yes    Height: 5' 5" (1.651 m)    Weight: 78 kg (172 lb)    Length of need (1-99 months): 99    Does patient have medical equipment at home? " none    Please check all that apply: Patient's condition impairs ambulation.    Please check all that apply: Patient is unable to safely ambulate without equipment.    Please check all that apply: Walker will be used for gait training.    Please check all that apply: Patient needs help to get in and out of chair.      Diet diabetic     Notify your health care provider if you experience any of the following:  difficulty breathing or increased cough     Notify your health care provider if you experience any of the following:  temperature >100.4     Notify your health care provider if you experience any of the following:  persistent nausea and vomiting or diarrhea     Notify your health care provider if you experience any of the following:  severe uncontrolled pain     Notify your health care provider if you experience any of the following:  severe persistent headache     Notify your health care provider if you experience any of the following:  worsening rash     Notify your health care provider if you experience any of the following:  persistent dizziness, light-headedness, or visual disturbances     Notify your health care provider if you experience any of the following:  increased confusion or weakness     Activity as tolerated        Medications:  Reconciled Home Medications:      Medication List      START taking these medications    acetaminophen 500 MG tablet  Commonly known as: TYLENOL  Take 2 tablets (1,000 mg total) by mouth every 8 (eight) hours as needed for Pain.     gabapentin 100 MG capsule  Commonly known as: NEURONTIN  Take 1 capsule (100 mg total) by mouth 3 (three) times daily.     levoFLOXacin 750 MG tablet  Commonly known as: LEVAQUIN  Take 1 tablet (750 mg total) by mouth every evening. for 21 days     oxyCODONE 5 MG immediate release tablet  Commonly known as: ROXICODONE  Take 1 tablet (5 mg total) by mouth every 8 (eight) hours as needed (Pain not controlled with acetaminophen).     polyethylene  glycol 17 gram/dose powder  Commonly known as: GLYCOLAX  Take 17 g by mouth once daily.        CHANGE how you take these medications    losartan 25 MG tablet  Commonly known as: COZAAR  Take 1 tablet (25 mg total) by mouth once daily.  What changed:   · medication strength  · how much to take        CONTINUE taking these medications    IMBRUVICA 420 mg Tab  Generic drug: ibrutinib  Take 1 tablet by mouth once daily.     multivitamin per tablet  Commonly known as: THERAGRAN  Take 1 tablet by mouth once daily.     timolol maleate 0.5% 0.5 % Solg  Commonly known as: TIMOPTIC-XE  Place 1 drop into both eyes 2 (two) times daily.        STOP taking these medications    amLODIPine 5 MG tablet  Commonly known as: NORVASC     chlorthalidone 25 MG Tab  Commonly known as: HYGROTEN            Indwelling Lines/Drains at time of discharge:   Lines/Drains/Airways     None                 Time spent on the discharge of patient: 35 minutes         Miah Burns MD  Department of Hospital Medicine  [facility], operated by [facility]

## 2023-01-17 ENCOUNTER — EXTERNAL HOME HEALTH (OUTPATIENT)
Dept: HOME HEALTH SERVICES | Facility: HOSPITAL | Age: 63
End: 2023-01-17
Payer: MEDICARE

## 2023-02-02 ENCOUNTER — OFFICE VISIT (OUTPATIENT)
Dept: INFECTIOUS DISEASES | Facility: CLINIC | Age: 63
End: 2023-02-02
Payer: MEDICARE

## 2023-02-02 ENCOUNTER — CLINICAL SUPPORT (OUTPATIENT)
Dept: INFECTIOUS DISEASES | Facility: CLINIC | Age: 63
End: 2023-02-02
Payer: MEDICARE

## 2023-02-02 VITALS
TEMPERATURE: 98 F | DIASTOLIC BLOOD PRESSURE: 84 MMHG | SYSTOLIC BLOOD PRESSURE: 141 MMHG | HEART RATE: 79 BPM | BODY MASS INDEX: 27.66 KG/M2 | WEIGHT: 166.25 LBS

## 2023-02-02 DIAGNOSIS — R78.81 BACTEREMIA DUE TO STREPTOCOCCUS PNEUMONIAE: Primary | ICD-10-CM

## 2023-02-02 DIAGNOSIS — B95.3 BACTEREMIA DUE TO STREPTOCOCCUS PNEUMONIAE: Primary | ICD-10-CM

## 2023-02-02 PROCEDURE — 99999 PR PBB SHADOW E&M-EST. PATIENT-LVL III: ICD-10-PCS | Mod: PBBFAC,,, | Performed by: INTERNAL MEDICINE

## 2023-02-02 PROCEDURE — 99999 PR PBB SHADOW E&M-EST. PATIENT-LVL III: CPT | Mod: PBBFAC,,, | Performed by: INTERNAL MEDICINE

## 2023-02-02 PROCEDURE — 99214 OFFICE O/P EST MOD 30 MIN: CPT | Mod: S$PBB,,, | Performed by: INTERNAL MEDICINE

## 2023-02-02 PROCEDURE — 99213 OFFICE O/P EST LOW 20 MIN: CPT | Mod: PBBFAC | Performed by: INTERNAL MEDICINE

## 2023-02-02 PROCEDURE — 90677 PCV20 VACCINE IM: CPT | Mod: PBBFAC

## 2023-02-02 PROCEDURE — 90472 IMMUNIZATION ADMIN EACH ADD: CPT | Mod: PBBFAC

## 2023-02-02 PROCEDURE — 99214 PR OFFICE/OUTPT VISIT, EST, LEVL IV, 30-39 MIN: ICD-10-PCS | Mod: S$PBB,,, | Performed by: INTERNAL MEDICINE

## 2023-02-02 NOTE — PROGRESS NOTES
Patient received zoster #1 in the left deltoid and Prevnar 20 vaccine in the right deltoid. Pt tolerated well. Pt asked to wait in the clinic 15 minutes after injection in the event of an allergic reaction. Pt verbalized understanding. Pt left in NAD.

## 2023-02-02 NOTE — PROGRESS NOTES
[facility]ECT[facility] DIS[facility]ASE [facility]  02/02/2023 10:35 AM    Subjective:      Chief Complaint:   Chief Complaint   Patient presents with    Follow-up       History of Present Illness:    Patient Brigitte Sorto is a 62 y.o. female with CLL on ibrutinib who presents today for hospital f/u. Admitted to [facility] several months ago with strep pneumo bacteremia. Treated with ceftriaxone and d/c'd on levaquin. Reports compliace with po leaquin at d/c x 2 weeksWeather bad, missed prior ID appt    L hip pain better, seeing ortho, got steroid shot and says pain better. Thinks osteo arthritis    B/l shoulder pain- saw ortho gave shot. Denies acute symptoms, but does report decreased ROM      Review of Symptoms:  Constitutional: Denies fevers, chills, or weakness.  ENT: Denies dysphagia, nasal discharge, ear pain or discharge.  Cardiovascular: Denies chest pain, palpitations, orthopnea, or claudication.  Respiratory: Denies shortness of breath, cough, hemoptysis, or wheezing.  GI: Denies nausea/vomitting, hematochezia, melena, abd pain, or changes in appetite.  : Denies dysuria, incontinence, or hematuria.  Musculoskeletal: Denies joint pain or myalgias.  Skin/breast: Denies rashes, lumps, lesions, or discharge.  Neurologic: Denies headache, dizziness, vertigo, or paresthesias.    Past Medical History:   Diagnosis Date    Cancer 5yrs ago    leukemia     CLL (chronic lymphocytic leukemia)     Diabetes mellitus        No past surgical history on file.    Family History   Problem Relation Age of Onset    Cancer Mother     Throat cancer Mother     Cirrhosis Father     Cancer Sister     Hypertension Sister     Breast cancer Sister     Lung cancer Sister     Diabetes Brother     Eczema Neg Hx     Psoriasis Neg Hx        Social History     Socioeconomic History    Marital status: Single   Occupational History    Occupation:    Tobacco Use    Smoking status: Never    Smokeless tobacco: Never   Substance and Sexual Activity     Alcohol use: No    Drug use: No   Other Topics Concern    Are you pregnant or think you may be? No    Breast-feeding No     Social Determinants of Health     Financial Resource Strain: Medium Risk    Difficulty of Paying Living Expenses: Somewhat hard   Food Insecurity: Food Insecurity Present    Worried About Running Out of Food in the Last Year: Sometimes true    Ran Out of Food in the Last Year: Never true   Transportation Needs: No Transportation Needs    Lack of Transportation (Medical): No    Lack of Transportation (Non-Medical): No   Physical Activity: Inactive    Days of Exercise per Week: 0 days    Minutes of Exercise per Session: 0 min   Stress: No Stress Concern Present    Feeling of Stress : Only a little   Social Connections: Unknown    Frequency of Communication with Friends and Family: More than three times a week    Frequency of Social Gatherings with Friends and Family: More than three times a week    Attends Taoism Services: Never    Active Member of Clubs or Organizations: Yes    Attends Club or Organization Meetings: 1 to 4 times per year   Housing Stability: High Risk    Unable to Pay for Housing in the Last Year: Yes    Number of Places Lived in the Last Year: 1    Unstable Housing in the Last Year: No       Review of patient's allergies indicates:   Allergen Reactions    Amoxicillin-pot clavulanate Diarrhea and Hives    Amoxicillin          Objective:   VS (24h):   Vitals:    02/02/23 1016   BP: (!) 141/84   Pulse: 79   Temp: 98 °F (36.7 °C)     [unfilled]  General: Afebrile, alert, comfortable, no acute distress.   HEENT: MARGARET. EOMI, no scleral icterus.   Pulmonary: Non labored,clear to auscultation A/P/L. No wheezing, crackles, or rhonchi.  Cardiac: normal S1 & S2 w/o rubs/murmurs/gallops.   Abdominal: Non-tender, non-distended.  Extremities: Moves all extremities x 4.   Skin: No jaundice, rashes, or visible lesions.   Neurological:  Alert and oriented x 4.     Labs:    Glucose   Date  Value Ref Range Status   11/07/2022 116 (H) 70 - 110 mg/dL Final   11/06/2022 142 (H) 70 - 110 mg/dL Final   11/05/2022 111 (H) 70 - 110 mg/dL Final       Calcium   Date Value Ref Range Status   11/07/2022 9.2 8.7 - 10.5 mg/dL Final   11/06/2022 9.7 8.7 - 10.5 mg/dL Final   11/05/2022 9.8 8.7 - 10.5 mg/dL Final       Albumin   Date Value Ref Range Status   11/02/2022 2.4 (L) 3.5 - 5.2 g/dL Final   11/01/2022 3.1 (L) 3.5 - 5.2 g/dL Final   01/26/2015 4.1 3.5 - 5.2 g/dL Final       Total Protein   Date Value Ref Range Status   11/02/2022 6.8 6.0 - 8.4 g/dL Final   11/01/2022 8.1 6.0 - 8.4 g/dL Final   01/26/2015 8.2 6.0 - 8.4 g/dL Final       Sodium   Date Value Ref Range Status   11/07/2022 141 136 - 145 mmol/L Final   11/06/2022 140 136 - 145 mmol/L Final   11/05/2022 140 136 - 145 mmol/L Final       Potassium   Date Value Ref Range Status   11/07/2022 3.9 3.5 - 5.1 mmol/L Final   11/06/2022 3.6 3.5 - 5.1 mmol/L Final   11/05/2022 3.5 3.5 - 5.1 mmol/L Final       CO2   Date Value Ref Range Status   11/07/2022 28 23 - 29 mmol/L Final   11/06/2022 29 23 - 29 mmol/L Final   11/05/2022 29 23 - 29 mmol/L Final       Chloride   Date Value Ref Range Status   11/07/2022 104 95 - 110 mmol/L Final   11/06/2022 103 95 - 110 mmol/L Final   11/05/2022 104 95 - 110 mmol/L Final       BUN   Date Value Ref Range Status   11/07/2022 8 8 - 23 mg/dL Final   11/06/2022 10 8 - 23 mg/dL Final   11/05/2022 10 8 - 23 mg/dL Final       Creatinine   Date Value Ref Range Status   11/07/2022 0.6 0.5 - 1.4 mg/dL Final   11/06/2022 0.6 0.5 - 1.4 mg/dL Final   11/05/2022 0.6 0.5 - 1.4 mg/dL Final       Alkaline Phosphatase   Date Value Ref Range Status   11/02/2022 345 (H) 55 - 135 U/L Final   11/01/2022 379 (H) 55 - 135 U/L Final   01/26/2015 104 55 - 135 U/L Final       ALT   Date Value Ref Range Status   11/02/2022 159 (H) 10 - 44 U/L Final   11/01/2022 229 (H) 10 - 44 U/L Final   01/26/2015 25 10 - 44 U/L Final       AST   Date Value Ref  Range Status   11/02/2022 112 (H) 10 - 40 U/L Final   11/01/2022 197 (H) 10 - 40 U/L Final   01/26/2015 30 10 - 40 U/L Final       Total Bilirubin   Date Value Ref Range Status   11/02/2022 1.1 (H) 0.1 - 1.0 mg/dL Final     Comment:     For infants and newborns, interpretation of results should be based  on gestational age, weight and in agreement with clinical  observations.    Premature Infant recommended reference ranges:  Up to 24 hours.............<8.0 mg/dL  Up to 48 hours............<12.0 mg/dL  3-5 days..................<15.0 mg/dL  6-29 days.................<15.0 mg/dL     11/01/2022 2.7 (H) 0.1 - 1.0 mg/dL Final     Comment:     For infants and newborns, interpretation of results should be based  on gestational age, weight and in agreement with clinical  observations.    Premature Infant recommended reference ranges:  Up to 24 hours.............<8.0 mg/dL  Up to 48 hours............<12.0 mg/dL  3-5 days..................<15.0 mg/dL  6-29 days.................<15.0 mg/dL     01/26/2015 0.4 0.1 - 1.0 mg/dL Final     Comment:     For infants and newborns, interpretation of results should be based  on gestational age, weight and in agreement with clinical  observations.  Premature Infant recommended reference ranges:  Up to 24 hours.............<8.0 mg/dL  Up to 48 hours............<12.0 mg/dL  3-5 days..................<15.0 mg/dL  6-29 days.................<15.0 mg/dL         WBC   Date Value Ref Range Status   11/07/2022 8.01 3.90 - 12.70 K/uL Final   11/06/2022 8.48 3.90 - 12.70 K/uL Final   11/05/2022 8.89 3.90 - 12.70 K/uL Final       Hemoglobin   Date Value Ref Range Status   11/07/2022 9.2 (L) 12.0 - 16.0 g/dL Final   11/06/2022 9.6 (L) 12.0 - 16.0 g/dL Final   11/05/2022 9.6 (L) 12.0 - 16.0 g/dL Final       Hematocrit   Date Value Ref Range Status   11/07/2022 28.9 (L) 37.0 - 48.5 % Final   11/06/2022 29.5 (L) 37.0 - 48.5 % Final   11/05/2022 29.7 (L) 37.0 - 48.5 % Final       MCV   Date Value Ref  Range Status   11/07/2022 74 (L) 82 - 98 fL Final   11/06/2022 73 (L) 82 - 98 fL Final   11/05/2022 74 (L) 82 - 98 fL Final       Platelets   Date Value Ref Range Status   11/07/2022 346 150 - 450 K/uL Final   11/06/2022 304 150 - 450 K/uL Final   11/05/2022 230 150 - 450 K/uL Final       Lab Results   Component Value Date    CHOL 185 04/11/2022    CHOL 185 06/17/2020       Lab Results   Component Value Date    HDL 64 (H) 04/11/2022    HDL 56 06/17/2020       Lab Results   Component Value Date    LDLCALC 97 04/11/2022    LDLCALC 115 06/17/2020       Lab Results   Component Value Date    TRIG 119 04/11/2022    TRIG 71 06/17/2020       Lab Results   Component Value Date    CHOLHDL 2.89 04/11/2022    CHOLHDL 3.30 06/17/2020     No results found for: RPR  No results found for: QUANTIFERON    Medications:  Current Outpatient Medications on File Prior to Visit   Medication Sig Dispense Refill    acetaminophen (TYLENOL) 500 MG tablet Take 2 tablets (1,000 mg total) by mouth every 8 (eight) hours as needed for Pain.  0    gabapentin (NEURONTIN) 100 MG capsule Take 1 capsule (100 mg total) by mouth 3 (three) times daily. 90 capsule 11    IMBRUVICA 420 mg Tab Take 1 tablet by mouth once daily.      losartan (COZAAR) 25 MG tablet Take 1 tablet (25 mg total) by mouth once daily. 30 tablet 0    multivitamin (THERAGRAN) per tablet Take 1 tablet by mouth once daily.      polyethylene glycol (GLYCOLAX) 17 gram/dose powder Take 17 g by mouth once daily. 510 g 0    timolol maleate 0.5% (TIMOPTIC-XE) 0.5 % SolG Place 1 drop into both eyes 2 (two) times daily.       No current facility-administered medications on file prior to visit.       Antibiotics:   Antibiotics (From admission, onward)      None            HIV: No components found for: HIV 1/2 AG/AB  Hepatitis C IgG: No components found for: HEPATITIS C  Syphilis: No results found for: RPR    Hepatitis A IgG: No components found for: HEPATITIS A IGG  Hepatitis Bc IgG: No  components found for: HEPATITIS B CORE IGG  Hepatitis Bs IgG:  Quantiferon: No results found for: QUANTIFERON  VZV IgG: No components found for: VARICELLA IGG    No components found for: SEDIMENTATION RATE  No components found for: C-REACTIVE PROTEIN      Microbiology x 7d:   Microbiology Results (last 7 days)       ** No results found for the last 168 hours. **            Immunization History   Administered Date(s) Administered    COVID-19, MRNA, LN-S, PF (Pfizer) (Purple Cap) 03/08/2021, 03/29/2021         Reviewed records today as well as relevant labs, cultures, and imaging    Assessment:     Strep pneumo bacteremia  H/o CLL  Vaccine counseling    Hip pain resolved, no signs of metastatic strep pneumo infection. No toxicities from antimicrobials    Estab with heme-onc and says they are following her pulmonary nodules    Plan:     Agree with stopping antibiotics    Prevnar and shingrex vaccines today      - The following labs were ordered:    Orders Placed This Encounter   Procedures    (In Office Administered) Pneumococcal Conjugate Vaccine (20 Valent) (IM)     Standing Status:   Future     Number of Occurrences:   1     Standing Expiration Date:   2/2/2024    Zoster Recombinant Vaccine     Standing Status:   Future     Number of Occurrences:   1     Standing Expiration Date:   2/2/2024    Zoster Recombinant Vaccine     Standing Status:   Future     Standing Expiration Date:   2/2/2024       I have sent communication to the referring physician and/or primary care provider.     I spent a total of 31 minutes on the day of the visit. This includes face to face time and non-face to face time preparing to see the patient (eg, review of tests), obtaining and/or reviewing separately obtained history, documenting clinical information in the electronic or other health record, independently interpreting results, and communicating results to the patient/family/caregiver, or care coordination.    The total time for  evaluation and management services performed on 02/02/2023 was greater than 30 minutes.     Rtc prn    Amisha Cantu MD, MPH  Infectious Disease

## 2023-04-20 ENCOUNTER — TELEPHONE (OUTPATIENT)
Dept: INFECTIOUS DISEASES | Facility: CLINIC | Age: 63
End: 2023-04-20
Payer: MEDICARE

## 2023-04-20 NOTE — TELEPHONE ENCOUNTER
----- Message from Abi Oneal sent at 4/20/2023  9:46 AM CDT -----  Regarding: Pt Advice  Contact: [phone]  Pt is calling to see if she can come in today for her second shingles vaccine.  Pt is at the facility now.  Please call.

## 2023-04-25 ENCOUNTER — CLINICAL SUPPORT (OUTPATIENT)
Dept: INFECTIOUS DISEASES | Facility: CLINIC | Age: 63
End: 2023-04-25
Payer: MEDICARE

## 2023-04-25 PROCEDURE — 90750 HZV VACC RECOMBINANT IM: CPT | Mod: PBBFAC

## 2023-04-25 PROCEDURE — 90471 IMMUNIZATION ADMIN: CPT | Mod: PBBFAC

## 2023-04-25 NOTE — PROGRESS NOTES
Patient received Shingrix vaccine IM in right arm.  Patient tolerated well and left clinic NAD after observation.

## 2024-05-30 ENCOUNTER — OFFICE VISIT (OUTPATIENT)
Dept: URGENT CARE | Facility: CLINIC | Age: 64
End: 2024-05-30
Payer: MEDICARE

## 2024-05-30 VITALS
OXYGEN SATURATION: 97 % | SYSTOLIC BLOOD PRESSURE: 130 MMHG | BODY MASS INDEX: 27.56 KG/M2 | WEIGHT: 165.38 LBS | HEIGHT: 65 IN | RESPIRATION RATE: 20 BRPM | HEART RATE: 76 BPM | TEMPERATURE: 98 F | DIASTOLIC BLOOD PRESSURE: 63 MMHG

## 2024-05-30 DIAGNOSIS — S20.222A CONTUSION OF LEFT SIDE OF BACK, INITIAL ENCOUNTER: ICD-10-CM

## 2024-05-30 DIAGNOSIS — L23.9 ALLERGIC DERMATITIS: Primary | ICD-10-CM

## 2024-05-30 PROCEDURE — 96372 THER/PROPH/DIAG INJ SC/IM: CPT | Mod: S$GLB,,, | Performed by: FAMILY MEDICINE

## 2024-05-30 PROCEDURE — 99213 OFFICE O/P EST LOW 20 MIN: CPT | Mod: 25,S$GLB,, | Performed by: FAMILY MEDICINE

## 2024-05-30 RX ORDER — BETAMETHASONE SODIUM PHOSPHATE AND BETAMETHASONE ACETATE 3; 3 MG/ML; MG/ML
6 INJECTION, SUSPENSION INTRA-ARTICULAR; INTRALESIONAL; INTRAMUSCULAR; SOFT TISSUE
Status: COMPLETED | OUTPATIENT
Start: 2024-05-30 | End: 2024-05-30

## 2024-05-30 RX ORDER — IBUPROFEN 600 MG/1
600 TABLET ORAL EVERY 8 HOURS PRN
Qty: 30 TABLET | Refills: 0 | Status: SHIPPED | OUTPATIENT
Start: 2024-05-30

## 2024-05-30 RX ORDER — MOMETASONE FUROATE 1 MG/G
CREAM TOPICAL DAILY
Qty: 45 G | Refills: 0 | Status: SHIPPED | OUTPATIENT
Start: 2024-05-30

## 2024-05-30 RX ADMIN — BETAMETHASONE SODIUM PHOSPHATE AND BETAMETHASONE ACETATE 6 MG: 3; 3 INJECTION, SUSPENSION INTRA-ARTICULAR; INTRALESIONAL; INTRAMUSCULAR; SOFT TISSUE at 10:05

## 2024-05-30 NOTE — PROGRESS NOTES
"Subjective:      Patient ID: Brigitte Sorto is a 63 y.o. female.    Vitals:  height is 5' 5" (1.651 m) and weight is 75 kg (165 lb 5.5 oz). Her oral temperature is 98.2 °F (36.8 °C). Her blood pressure is 130/63 and her pulse is 76. Her respiration is 20 and oxygen saturation is 97%.     Chief Complaint: Joint Swelling    Patient reports slipped in her bathroom and fell back and hit her back against the toilet bowl. Patient reports taking ibuprofen for the pain. Pt states she has swelling of right ankle that she noticed a week ago.  Pt states there is no pain associated with the swelling.    Edema  This is a new problem. Episode onset: 1 week ago. The problem has been gradually improving. Pertinent negatives include no abdominal pain, anorexia, arthralgias, change in bowel habit, chest pain, chills, congestion, coughing, fatigue, fever, headaches, joint swelling, myalgias, nausea, neck pain, numbness, rash, sore throat, swollen glands, urinary symptoms, vertigo, visual change, vomiting or weakness. Nothing aggravates the symptoms. She has tried ice for the symptoms. The treatment provided moderate relief.       Constitution: Negative for chills, fatigue and fever.   HENT:  Negative for congestion and sore throat.    Neck: Negative for neck pain.   Cardiovascular:  Negative for chest pain.   Respiratory:  Negative for cough.    Gastrointestinal:  Negative for abdominal pain, nausea and vomiting.   Musculoskeletal:  Negative for joint pain, joint swelling and muscle ache.   Skin:  Negative for rash.   Neurological:  Negative for history of vertigo, headaches and numbness.      Objective:     Physical Exam   Constitutional: She is oriented to person, place, and time.   HENT:   Head: Normocephalic and atraumatic.   Cardiovascular: Normal rate, regular rhythm, normal heart sounds and normal pulses.   Pulmonary/Chest: Effort normal and breath sounds normal.   Abdominal: Normal appearance.   Neurological: no focal deficit. " She is alert, oriented to person, place, and time and at baseline. She displays no weakness. Gait normal.   Skin: Skin is warm. bruising (left lower back/lumbar region) and lesion (scattered papular and hives lesions on extremities - upper and lower)   Nursing note and vitals reviewed.    Assessment:     1. Allergic dermatitis    2. Contusion of left side of back, initial encounter        Plan:       Allergic dermatitis  -     betamethasone acetate-betamethasone sodium phosphate injection 6 mg  -     mometasone 0.1% (ELOCON) 0.1 % cream; Apply topically once daily.  Dispense: 45 g; Refill: 0    Contusion of left side of back, initial encounter  -     ibuprofen (ADVIL,MOTRIN) 600 MG tablet; Take 1 tablet (600 mg total) by mouth every 8 (eight) hours as needed for Pain (with food).  Dispense: 30 tablet; Refill: 0

## 2024-06-18 ENCOUNTER — HOSPITAL ENCOUNTER (EMERGENCY)
Facility: OTHER | Age: 64
Discharge: HOME | End: 2024-06-18
Attending: EMERGENCY MEDICINE
Payer: MEDICARE

## 2024-06-18 VITALS
DIASTOLIC BLOOD PRESSURE: 74 MMHG | TEMPERATURE: 98 F | OXYGEN SATURATION: 97 % | RESPIRATION RATE: 18 BRPM | HEART RATE: 80 BPM | SYSTOLIC BLOOD PRESSURE: 142 MMHG

## 2024-06-18 DIAGNOSIS — R05.9 COUGH: ICD-10-CM

## 2024-06-18 DIAGNOSIS — J06.9 URI WITH COUGH AND CONGESTION: Primary | ICD-10-CM

## 2024-06-18 LAB
CTP QC/QA: YES
CTP QC/QA: YES
POC MOLECULAR INFLUENZA A AGN: NEGATIVE
POC MOLECULAR INFLUENZA B AGN: NEGATIVE
SARS-COV-2 RDRP RESP QL NAA+PROBE: NEGATIVE

## 2024-06-18 PROCEDURE — 87635 SARS-COV-2 COVID-19 AMP PRB: CPT | Performed by: EMERGENCY MEDICINE

## 2024-06-18 PROCEDURE — 99283 EMERGENCY DEPT VISIT LOW MDM: CPT | Mod: 25

## 2024-06-18 RX ORDER — LORATADINE 10 MG/1
10 TABLET ORAL DAILY
Qty: 20 TABLET | Refills: 0 | Status: SHIPPED | OUTPATIENT
Start: 2024-06-18

## 2024-06-18 RX ORDER — FLUTICASONE PROPIONATE 50 MCG
1 SPRAY, SUSPENSION (ML) NASAL 2 TIMES DAILY PRN
Qty: 15 G | Refills: 0 | Status: SHIPPED | OUTPATIENT
Start: 2024-06-18

## 2024-06-18 RX ORDER — PROMETHAZINE HYDROCHLORIDE AND DEXTROMETHORPHAN HYDROBROMIDE 6.25; 15 MG/5ML; MG/5ML
5 SYRUP ORAL EVERY 4 HOURS PRN
Qty: 118 ML | Refills: 0 | Status: SHIPPED | OUTPATIENT
Start: 2024-06-18 | End: 2024-06-28

## 2024-06-18 NOTE — ED TRIAGE NOTES
Pt reports to ED with productive cough, congestion, runny nose, and generalized fatigue for the past week. Denies any other symptoms at this time. VSS.

## 2024-06-18 NOTE — ED PROVIDER NOTES
Encounter Date: 6/18/2024       History     Chief Complaint   Patient presents with    URI     Cough, congestion, fatigue x1 week. Taking antihistamines with no relief.     63-year-old female with a history of diabetes, CLL presents to the ER for evaluation of URI symptoms.  Patient reports that symptoms have been ongoing for the last 1 week.  She reports congested cough with yellow sputum.  She has also had a runny nose.  She has been taking allergy medicine with only slight alleviation.  No shortness of breath or chest pain otherwise.  Denies any abdominal pain, nausea vomiting or diarrhea.      The history is provided by the patient.     Review of patient's allergies indicates:   Allergen Reactions    Amoxicillin-pot clavulanate Diarrhea and Hives    Amoxicillin      Past Medical History:   Diagnosis Date    Cancer 5yrs ago    leukemia     CLL (chronic lymphocytic leukemia)     Diabetes mellitus      History reviewed. No pertinent surgical history.  Family History   Problem Relation Name Age of Onset    Cancer Mother      Throat cancer Mother      Cirrhosis Father      Cancer Sister      Hypertension Sister      Breast cancer Sister      Lung cancer Sister      Diabetes Brother      Eczema Neg Hx      Psoriasis Neg Hx       Social History     Tobacco Use    Smoking status: Never     Passive exposure: Never    Smokeless tobacco: Never   Substance Use Topics    Alcohol use: No    Drug use: No     Review of Systems   Constitutional:  Negative for chills and fever.   HENT:  Positive for congestion, rhinorrhea and sneezing. Negative for sore throat.    Eyes:  Negative for visual disturbance.   Respiratory:  Positive for cough. Negative for shortness of breath.    Cardiovascular:  Negative for chest pain.   Gastrointestinal:  Negative for abdominal pain, nausea and vomiting.   Genitourinary:  Negative for dysuria and flank pain.   Musculoskeletal:  Negative for myalgias.   Skin:  Negative for rash.    Allergic/Immunologic: Negative for immunocompromised state.   Neurological:  Negative for weakness and numbness.   Hematological:  Does not bruise/bleed easily.   Psychiatric/Behavioral:  Negative for confusion.        Physical Exam     Initial Vitals [06/18/24 1651]   BP Pulse Resp Temp SpO2   (!) 145/70 85 16 98.2 °F (36.8 °C) 97 %      MAP       --         Physical Exam    Vitals reviewed.  Constitutional: She appears well-developed and well-nourished. She is not diaphoretic. No distress.   HENT:   Head: Normocephalic and atraumatic.   Right Ear: Tympanic membrane normal.   Left Ear: Tympanic membrane normal.   Nose: Mucosal edema and rhinorrhea present.   Mouth/Throat: Uvula is midline, oropharynx is clear and moist and mucous membranes are normal.   Eyes: Conjunctivae and EOM are normal.   Neck: Neck supple.   Cardiovascular:  Normal rate, regular rhythm, normal heart sounds and intact distal pulses.           Pulmonary/Chest: Breath sounds normal. No respiratory distress.   Abdominal: Abdomen is soft. She exhibits no distension. There is no abdominal tenderness. There is no rebound.   Musculoskeletal:         General: Normal range of motion.      Cervical back: Neck supple.     Neurological: She is alert and oriented to person, place, and time.   Skin: Skin is warm.         ED Course   Procedures  Labs Reviewed   SARS-COV-2 RDRP GENE   POCT INFLUENZA A/B MOLECULAR          Imaging Results              X-Ray Chest PA And Lateral (Final result)  Result time 06/18/24 17:18:15      Final result by Bora Short MD (06/18/24 17:18:15)                   Impression:      As above.      Electronically signed by: Bora Short MD  Date:    06/18/2024  Time:    17:18               Narrative:    EXAMINATION:  XR CHEST PA AND LATERAL    CLINICAL HISTORY:  Cough, unspecified    TECHNIQUE:  PA and lateral views of the chest were performed.    COMPARISON:  11/01/2022    FINDINGS:  There is no consolidation, effusion, or  pneumothorax.  Minimal bibasilar atelectasis.  Cardiomediastinal silhouette is unremarkable.  Regional osseous structures are unremarkable.  Retained catheter fragment seen over the left brachiocephalic vein, terminating over the SVC.                                       Medications - No data to display  Medical Decision Making  Amount and/or Complexity of Data Reviewed  Labs: ordered.  Radiology: ordered. Decision-making details documented in ED Course.    Risk  OTC drugs.  Prescription drug management.         APC / Resident Notes:   Patient seen in the ER promptly upon arrival.  She has afebrile, no acute distress.  Physical examination reveals clear rhinorrhea, nasal edema bilaterally.  Heart lung sounds normal.  No wheezing noted.            ED Course as of 06/18/24 1849   Tue Jun 18, 2024 1751 X-Ray Chest PA And Lateral  FINDINGS:  There is no consolidation, effusion, or pneumothorax.  Minimal bibasilar atelectasis.  Cardiomediastinal silhouette is unremarkable.  Regional osseous structures are unremarkable.  Retained catheter fragment seen over the left brachiocephalic vein, terminating over the SVC.      [AJ]   1844 COVID and flu test negative. [AJ]   1845 Suspect symptoms secondary to viral etiology, upper respiratory infection.  Will prescribed home on Phenergan DM, Flonase and Claritin.  Will advise to take Tylenol or Motrin for fever and pain as needed.  Advised close follow-up with primary doctor. [AJ]   1845 Given strict return precautions ED which was agreeable to.  Patient is otherwise stable for discharge and close follow-up [AJ]      ED Course User Index  [AJ] Bee Washburn PA-C                           Clinical Impression:  Final diagnoses:  [R05.9] Cough  [J06.9] URI with cough and congestion (Primary)          ED Disposition Condition    Discharge Stable          ED Prescriptions       Medication Sig Dispense Start Date End Date Auth. Provider    promethazine-dextromethorphan  (PROMETHAZINE-DM) 6.25-15 mg/5 mL Syrp Take 5 mLs by mouth every 4 (four) hours as needed. 118 mL 6/18/2024 6/28/2024 Bee Washburn PA-C    fluticasone propionate (FLONASE) 50 mcg/actuation nasal spray 1 spray (50 mcg total) by Each Nostril route 2 (two) times daily as needed for Rhinitis. 15 g 6/18/2024 -- Bee Washburn PA-C    loratadine (CLARITIN) 10 mg tablet Take 1 tablet (10 mg total) by mouth once daily. 20 tablet 6/18/2024 -- Bee Washburn PA-C          Follow-up Information       Follow up With Specialties Details Why Contact Info    Ace Kyle MD Internal Medicine   [address] [facility]  [place] [address]  [phone]               Bee Washburn PA-C  06/18/24 3212

## 2024-06-18 NOTE — DISCHARGE INSTRUCTIONS
Take Cough medication as prescribed.  You may take Tylenol or Motrin for fever and pain as needed.  Follow up with your family doctor in the next 2-3 days.  Return to the ER with concerning or worsening symptoms.

## 2025-01-09 ENCOUNTER — OFFICE VISIT (OUTPATIENT)
Dept: URGENT CARE | Facility: CLINIC | Age: 65
End: 2025-01-09
Payer: MEDICARE

## 2025-01-09 VITALS
WEIGHT: 159.94 LBS | TEMPERATURE: 99 F | DIASTOLIC BLOOD PRESSURE: 76 MMHG | HEIGHT: 65 IN | SYSTOLIC BLOOD PRESSURE: 142 MMHG | HEART RATE: 87 BPM | RESPIRATION RATE: 18 BRPM | BODY MASS INDEX: 26.65 KG/M2 | OXYGEN SATURATION: 96 %

## 2025-01-09 DIAGNOSIS — Z87.898 HISTORY OF WHEEZING: ICD-10-CM

## 2025-01-09 DIAGNOSIS — J10.1 INFLUENZA A: Primary | ICD-10-CM

## 2025-01-09 DIAGNOSIS — R05.9 COUGH, UNSPECIFIED TYPE: ICD-10-CM

## 2025-01-09 DIAGNOSIS — R52 BODY ACHES: ICD-10-CM

## 2025-01-09 LAB
CTP QC/QA: YES
CTP QC/QA: YES
POC MOLECULAR INFLUENZA A AGN: POSITIVE
POC MOLECULAR INFLUENZA B AGN: NEGATIVE
SARS-COV-2 AG RESP QL IA.RAPID: NEGATIVE

## 2025-01-09 PROCEDURE — 71046 X-RAY EXAM CHEST 2 VIEWS: CPT | Mod: S$GLB,,, | Performed by: RADIOLOGY

## 2025-01-09 RX ORDER — ACETAMINOPHEN 500 MG
1000 TABLET ORAL
Status: COMPLETED | OUTPATIENT
Start: 2025-01-09 | End: 2025-01-09

## 2025-01-09 RX ORDER — AMLODIPINE BESYLATE 5 MG/1
1 TABLET ORAL DAILY
COMMUNITY
Start: 2024-12-03

## 2025-01-09 RX ORDER — AZELASTINE 1 MG/ML
1 SPRAY, METERED NASAL
COMMUNITY
Start: 2024-12-03 | End: 2025-12-03

## 2025-01-09 RX ORDER — ALBUTEROL SULFATE 90 UG/1
2 AEROSOL, METERED RESPIRATORY (INHALATION) EVERY 6 HOURS PRN
Qty: 18 G | Refills: 0 | Status: SHIPPED | OUTPATIENT
Start: 2025-01-09 | End: 2025-02-08

## 2025-01-09 RX ORDER — HYDROXYZINE PAMOATE 25 MG/1
CAPSULE ORAL
COMMUNITY
Start: 2025-01-02

## 2025-01-09 RX ORDER — OSELTAMIVIR PHOSPHATE 75 MG/1
75 CAPSULE ORAL 2 TIMES DAILY
Qty: 10 CAPSULE | Refills: 0 | Status: SHIPPED | OUTPATIENT
Start: 2025-01-09 | End: 2025-01-14

## 2025-01-09 RX ADMIN — Medication 1000 MG: at 10:01

## 2025-01-09 NOTE — PROGRESS NOTES
"Subjective:      Patient ID: Brigitte Sorto is a 64 y.o. female.    Vitals:  height is 5' 5" (1.651 m) and weight is 72.6 kg (159 lb 15.1 oz). Her temperature is 98.9 °F (37.2 °C). Her blood pressure is 142/76 (abnormal) and her pulse is 87. Her respiration is 18 and oxygen saturation is 96%.     Chief Complaint: Cough    63 yo female c/o cough and wheezing. Pt states she started having sx on Tuesday. Pt declines being around anyone who's sick.      Cough  This is a new problem. The current episode started in the past 7 days. The problem has been gradually worsening. The problem occurs constantly. The cough is Non-productive. Associated symptoms include shortness of breath and wheezing. Nothing aggravates the symptoms. Her past medical history is significant for environmental allergies.       Respiratory:  Positive for cough, shortness of breath and wheezing.    Allergic/Immunologic: Positive for environmental allergies.      Objective:     Physical Exam  Results for orders placed or performed in visit on 01/09/25   POCT Influenza A/B MOLECULAR    Collection Time: 01/09/25 10:53 AM   Result Value Ref Range    POC Molecular Influenza A Ag Positive (A) Negative    POC Molecular Influenza B Ag Negative Negative     Acceptable Yes    SARS Coronavirus 2 Antigen, POCT Manual Read    Collection Time: 01/09/25 10:57 AM   Result Value Ref Range    SARS Coronavirus 2 Antigen Negative Negative     Acceptable Yes      XR CHEST PA AND LATERAL    Result Date: 1/9/2025  EXAMINATION: XR CHEST PA AND LATERAL CLINICAL HISTORY: Cough, unspecified TECHNIQUE: PA and lateral views of the chest were performed. COMPARISON: 06/18/2024. FINDINGS: There is unchanged appearance of abundant left-sided central access catheter.  The trachea is unremarkable.  The cardiomediastinal silhouette is within normal limits.  There is no evidence of free air beneath the hemidiaphragms.  There is stable soft tissue prominence " of the hilum.  There are no pleural effusions.  There is no evidence of a pneumothorax.  There is no evidence of pneumomediastinum.  No airspace opacity is press.  There are degenerative changes in the osseous structures.     Stable examination. Electronically signed by: Paul Partida MD Date:    01/09/2025 Time:    11:00     Assessment:     1. Influenza A    2. Cough, unspecified type    3. History of wheezing    4. Body aches      Discussed discussed with patient findings COVID 19-flu a positive.  Reviewed with patient x-ray findings negative for pleural effusion or pneumothorax or pneumonia.  Reviewed discharge instructions with patient patient is to start Tamiflu as prescribed and management of symptoms.  Plan:       Influenza A  -     oseltamivir (TAMIFLU) 75 MG capsule; Take 1 capsule (75 mg total) by mouth 2 (two) times daily. for 5 days  Dispense: 10 capsule; Refill: 0    Cough, unspecified type  -     SARS Coronavirus 2 Antigen, POCT Manual Read  -     POCT Influenza A/B MOLECULAR  -     XR CHEST PA AND LATERAL; Future; Expected date: 01/09/2025    History of wheezing  -     XR CHEST PA AND LATERAL; Future; Expected date: 01/09/2025  -     albuterol (VENTOLIN HFA) 90 mcg/actuation inhaler; Inhale 2 puffs into the lungs every 6 (six) hours as needed for Wheezing. Rescue  Dispense: 18 g; Refill: 0    Body aches  -     acetaminophen tablet 1,000 mg      Patient Instructions   Discharge instructions for Influenza A and Cough with History of Wheezing.  Patient is a start Tamiflu as prescribed  Patient is to start albuterol inhaler for wheezing especially when she experiences with coughing at night  Patient to start Tessalon Perles as prescribed for management of cough at home      1) See orders for this visit as documented in the electronic medical record.  2) Symptomatic therapy suggested: use acetaminophen/ibuprofen every 6-8 hours prn pain or fever, push fluids.   3) Call or return to clinic prn if these  symptoms worsen or fail to improve as anticipated.    Discussed results/diagnosis/plan with patient in clinic.  We had shared decision making for patient's treatment. Patient verbalized understanding and in agreement with current treatment plan.     Patient was instructed to return for re-evaluation with urgent care or PCP for continued outpatient workup and management if symptoms do not improve/worsening symptoms. Strict ED versus clinic precautions given in depth.    Discharge and follow-up instructions given verbally/printed with the patient who expressed understanding. The instructions and results are also available on [employer].      - You must understand that you have received an Urgent Care treatment only and that you may be released before all of your medical problems are known or treated.   - You, the patient, will arrange for follow up care as instructed.   - Follow up with your PCP or specialty clinic as directed in the next 1-2 weeks if not improved or as needed.  You can call [phone] to schedule an appointment with the appropriate provider.   - If your condition worsens or fails to improve we recommend that you receive another evaluation at the ER immediately or contact your PCP to discuss your concerns or return here.        JOSHUA Watkins

## 2025-01-09 NOTE — PATIENT INSTRUCTIONS
Discharge instructions for Influenza A and Cough with History of Wheezing.  Patient is a start Tamiflu as prescribed  Patient is to start albuterol inhaler for wheezing especially when she experiences with coughing at night  Patient to start Tessalon Perles as prescribed for management of cough at home      1) See orders for this visit as documented in the electronic medical record.  2) Symptomatic therapy suggested: use acetaminophen/ibuprofen every 6-8 hours prn pain or fever, push fluids.   3) Call or return to clinic prn if these symptoms worsen or fail to improve as anticipated.    Discussed results/diagnosis/plan with patient in clinic.  We had shared decision making for patient's treatment. Patient verbalized understanding and in agreement with current treatment plan.     Patient was instructed to return for re-evaluation with urgent care or PCP for continued outpatient workup and management if symptoms do not improve/worsening symptoms. Strict ED versus clinic precautions given in depth.    Discharge and follow-up instructions given verbally/printed with the patient who expressed understanding. The instructions and results are also available on [employer].      - You must understand that you have received an Urgent Care treatment only and that you may be released before all of your medical problems are known or treated.   - You, the patient, will arrange for follow up care as instructed.   - Follow up with your PCP or specialty clinic as directed in the next 1-2 weeks if not improved or as needed.  You can call [phone] to schedule an appointment with the appropriate provider.   - If your condition worsens or fails to improve we recommend that you receive another evaluation at the ER immediately or contact your PCP to discuss your concerns or return here.        JOSHUA Watkins

## 2025-03-10 ENCOUNTER — OFFICE VISIT (OUTPATIENT)
Dept: URGENT CARE | Facility: CLINIC | Age: 65
End: 2025-03-10
Payer: MEDICARE

## 2025-03-10 VITALS
DIASTOLIC BLOOD PRESSURE: 69 MMHG | RESPIRATION RATE: 20 BRPM | OXYGEN SATURATION: 98 % | HEART RATE: 73 BPM | SYSTOLIC BLOOD PRESSURE: 152 MMHG | TEMPERATURE: 98 F | HEIGHT: 65 IN | WEIGHT: 160 LBS | BODY MASS INDEX: 26.66 KG/M2
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
## 2025-03-10 DIAGNOSIS — M25.552 LEFT HIP PAIN: Primary | ICD-10-CM

## 2025-03-10 PROCEDURE — 99213 OFFICE O/P EST LOW 20 MIN: CPT | Mod: S$GLB,,,

## 2025-03-10 PROCEDURE — 73502 X-RAY EXAM HIP UNI 2-3 VIEWS: CPT | Mod: LT,S$GLB,, | Performed by: RADIOLOGY

## 2025-03-10 RX ORDER — LIDOCAINE 50 MG/G
1 PATCH TOPICAL DAILY PRN
Qty: 4 PATCH | Refills: 0 | Status: SHIPPED | OUTPATIENT
Start: 2025-03-10 | End: 2025-03-14

## 2025-03-10 RX ORDER — ACETAMINOPHEN 500 MG
1000 TABLET ORAL EVERY 8 HOURS PRN
Qty: 18 TABLET | Refills: 0 | Status: SHIPPED | OUTPATIENT
Start: 2025-03-10 | End: 2025-03-15

## 2025-03-10 NOTE — PROGRESS NOTES
"Subjective:      Patient ID: Brigitte Sorto is a 64 y.o. female.    Vitals:  height is 5' 5" (1.651 m) and weight is 72.6 kg (160 lb). Her oral temperature is 98.2 °F (36.8 °C). Her blood pressure is 152/69 (abnormal) and her pulse is 73. Her respiration is 20 and oxygen saturation is 98%.     Chief Complaint: Leg Pain and Fall    This is a 64 y.o. female who presents today with a chief complaint of left hip pain that began a week ago.  Patient was running to the bathroom and her foot caught onto the carpet causing her to fall. Patient landed on left hip. Declines hitting head. Declines loss of consciousness.  She took OTC Ibuprofen with mild relief.     Leg Pain   The incident occurred more than 1 week ago. The incident occurred at home. The injury mechanism was a fall. The pain is present in the left leg and left hip. The quality of the pain is described as aching. The pain is at a severity of 6/10. The pain is moderate. The pain has been Intermittent since onset. Pertinent negatives include no inability to bear weight, loss of motion, loss of sensation, muscle weakness, numbness or tingling. The symptoms are aggravated by movement and weight bearing. She has tried NSAIDs for the symptoms. The treatment provided mild relief.     Constitution: Negative.   HENT: Negative.     Neck: neck negative.   Cardiovascular: Negative.    Eyes: Negative.    Respiratory: Negative.     Gastrointestinal: Negative.    Endocrine: negative.   Musculoskeletal:  Positive for pain and trauma.   Skin: Negative.    Allergic/Immunologic: Negative.    Neurological:  Negative for numbness.   Hematologic/Lymphatic: Negative.    Psychiatric/Behavioral: Negative.        Objective:     Physical Exam   Constitutional: She is oriented to person, place, and time.  Non-toxic appearance. She does not appear ill. No distress.   HENT:   Head: Normocephalic and atraumatic.   Ears:   Right Ear: Tympanic membrane, external ear and ear canal normal.   Left " Ear: Tympanic membrane, external ear and ear canal normal.   Nose: Nose normal.   Mouth/Throat: Mucous membranes are moist.   Eyes: Conjunctivae are normal.   Cardiovascular: Normal rate and regular rhythm.   Pulmonary/Chest: Effort normal and breath sounds normal. No stridor. No respiratory distress. She has no wheezes. She has no rhonchi. She has no rales.   Musculoskeletal:      Right hip: Normal.      Left hip: She exhibits tenderness. She exhibits no deformity and no laceration.   Neurological: She is alert and oriented to person, place, and time.   Skin: Skin is warm, dry and not diaphoretic.   Psychiatric: Her behavior is normal. Mood, judgment and thought content normal.     X-Ray Hip 2 or 3 views Left with Pelvis when performed  Result Date: 3/10/2025  EXAMINATION: XR HIP WITH PELVIS WHEN PERFORMED 2 OR 3 VIEWS LEFT CLINICAL HISTORY: Unspecified fall, initial encounter TECHNIQUE: AP view of the pelvis and frog leg lateral view of the left hip were performed. COMPARISON: 11/01/2022 FINDINGS: Bony structures of the pelvis are intact.  No fractures are noted.  Soft tissue calcification can be seen adjacent to the greater trochanters bilaterally.     See above Electronically signed by: Hector Davis MD Date:    03/10/2025 Time:    10:03      Assessment:     1. Left hip pain    2. Fall, initial encounter        Plan:       Left hip pain  -     X-Ray Hip 2 or 3 views Left with Pelvis when performed; Future; Expected date: 03/10/2025  -     Ambulatory referral/consult to Orthopedics  -     acetaminophen (TYLENOL) 500 MG tablet; Take 2 tablets (1,000 mg total) by mouth every 8 (eight) hours as needed for Pain.  Dispense: 18 tablet; Refill: 0  -     LIDOcaine (LIDODERM) 5 %; Place 1 patch onto the skin daily as needed (pain). Remove & Discard patch within 12 hours  Dispense: 4 patch; Refill: 0    Fall, initial encounter  -     X-Ray Hip 2 or 3 views Left with Pelvis when performed; Future; Expected date:  03/10/2025      Patient Instructions   - You must understand that you have received an Urgent Care treatment only and that you may be released before all of your medical problems are known or treated.   - You, the patient, will arrange for follow up care as instructed.   - If your condition worsens or fails to improve we recommend that you receive another evaluation at the ER immediately or contact your PCP to discuss your concerns or return here.  -please get plenty of rest.  -referral placed for ortho. Please call [phone] for scheduling.  Heating pad/warm compresses 3-4 times daily for 15 minutes at a time.

## 2025-03-10 NOTE — PATIENT INSTRUCTIONS
- You must understand that you have received an Urgent Care treatment only and that you may be released before all of your medical problems are known or treated.   - You, the patient, will arrange for follow up care as instructed.   - If your condition worsens or fails to improve we recommend that you receive another evaluation at the ER immediately or contact your PCP to discuss your concerns or return here.  -please get plenty of rest.  -referral placed for ortho. Please call [phone] for scheduling.  Heating pad/warm compresses 3-4 times daily for 15 minutes at a time.

## 2025-03-25 ENCOUNTER — OFFICE VISIT (OUTPATIENT)
Dept: SPORTS MEDICINE | Facility: CLINIC | Age: 65
End: 2025-03-25
Payer: MEDICARE

## 2025-03-25 VITALS
SYSTOLIC BLOOD PRESSURE: 136 MMHG | HEIGHT: 65 IN | BODY MASS INDEX: 26.77 KG/M2 | WEIGHT: 160.69 LBS | DIASTOLIC BLOOD PRESSURE: 76 MMHG

## 2025-03-25 DIAGNOSIS — M25.552 LEFT HIP PAIN: Primary | ICD-10-CM
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
## 2025-03-25 PROCEDURE — 99999 PR PBB SHADOW E&M-EST. PATIENT-LVL III: CPT | Mod: PBBFAC,,, | Performed by: STUDENT IN AN ORGANIZED HEALTH CARE EDUCATION/TRAINING PROGRAM

## 2025-03-25 PROCEDURE — 99213 OFFICE O/P EST LOW 20 MIN: CPT | Mod: PBBFAC,PN | Performed by: STUDENT IN AN ORGANIZED HEALTH CARE EDUCATION/TRAINING PROGRAM

## 2025-03-25 PROCEDURE — 99204 OFFICE O/P NEW MOD 45 MIN: CPT | Mod: S$PBB,,, | Performed by: STUDENT IN AN ORGANIZED HEALTH CARE EDUCATION/TRAINING PROGRAM

## 2025-03-25 NOTE — PROGRESS NOTES
CC: left hip pain    64 y.o. Female presents today for evaluation of her left hip pain. Pt states she fell onto her left hip about 3 weeks ago. Pt states she was able to ambulate after. Pt states she does not have pain where she hit her hip (lateral), but localizes pain to anterior hip and proximal anterior thigh. Pt reports pain is 8/10 today. Pt denies mechanical symptoms. Pt denies numbness/tingling.     Side: left  Problem: hip pain  Pain Score: 8.5/10  SYMPTOMS:   Time of onset: 3 weeks   Trauma, injury: fall  Pain location: anterior hip, anterior proximal thigh     C-sign: yes    Radiation past knee: no     Exacerbating factors / difficult actions:    Nocturnal pain lying with ipsilateral side down: no    Pivoting: yes    Putting on shoes / socks: no    Getting into / out of cars: yes    Getting up / down from chairs: yes    Known back problems: none  Weakness: none  Numbness: none  Mechanical symptoms:     Clicking, catching: none    Snapping internal: none    Snapping external: none    INTERVENTIONS:   Medications tried: tylenol extra strength 1000mg every 6 hours since fall (no relief), ibuprofen 400mg OTC every 4-6 hrs (mild relief)  Physical therapy: none for left; fPT for R hip 2 yrs ago  Injections: none    RELEVANT HISTORY:   Imaging to date: 3/10/25    Occupation: retired     REVIEW OF SYSTEMS:   Constitution: Patient denies fever or chills.  Eyes: Patient denies eye pain or vision changes.  HEENT: Patient denies ear pain, sore throat, or nasal discharge.  CVS: Patient denies chest pain.  Lungs: Patient denies shortness of breath or cough.  Abdomen: Patient denies any stomach pain, nausea, vomiting, or diarrhea  Skin: Patient denies skin rash or itching.    Musculoskeletal: Patient denies recent injuries or trauma.  Neuro: Patient denies any numbness or tingling in upper or lower extremities.  Psych: Patient denies any current anxiety or nervousness.    PAST MEDICAL HISTORY:   Past Medical History:  "  Diagnosis Date    Cancer 5yrs ago    leukemia     CLL (chronic lymphocytic leukemia)     Diabetes mellitus        PAST SURGICAL HISTORY:  History reviewed. No pertinent surgical history.    FAMILY HISTORY:  Family History   Problem Relation Name Age of Onset    Cancer Mother      Throat cancer Mother      Cirrhosis Father      Cancer Sister      Hypertension Sister      Breast cancer Sister      Lung cancer Sister      Diabetes Brother      Eczema Neg Hx      Psoriasis Neg Hx         SOCIAL HISTORY:  Social History[1]    MEDICATIONS:   Current Medications[2]    ALLERGIES:   Review of patient's allergies indicates:   Allergen Reactions    Amoxicillin-pot clavulanate Diarrhea and Hives    Amoxicillin         PHYSICAL EXAMINATION:  /76 (Patient Position: Sitting)   Ht 5' 5" (1.651 m)   Wt 72.9 kg (160 lb 11.5 oz)   BMI 26.74 kg/m²   Vitals signs and nursing note have been reviewed.    General: In no acute distress, well developed, well nourished, no diaphoresis  Eyes: EOM full and smooth, no eye redness or discharge  HEENT: normocephalic and atraumatic, neck supple, trachea midline, no nasal discharge  Cardiovascular: no LE edema  Lungs: respirations non-labored, no conversational dyspnea   Neuro: AAOx3, CN2-12 grossly intact  Skin: No rashes, warm and dry  Psychiatric: cooperative, pleasant, mood and affect appropriate for age    Left Hip:   Gait: mildly antalgic    Inspection/Palpation:   -Deformities     TTP:  -Greater trochanter  -Abductors  -ASIS  -AIIS   -Ischial tuberosity/hamstring origin  -Mid substance hamstring  -SIJ  -ITB    ROM (* = with pain):   Flexion: 120°  Popliteal Angle: 30°  Internal rotation seated: 30°  External rotation seated: 60°      Functional:   +Log roll   -ARTURO  +FADIR  -Figure-4  +Scour/Circumduction with pressure  -Tomás Test  -Snapping int  -Snapping ext  -Kesha's Sign  -Gaenslen's  +Stinchfield test  -Isis  -Lasegue's     Strength/Functional:   5/5 in glut med / " abductors    5/5 in hamstrings   5/5 in Quads    IMAGIN. Hip X-ray ordered due to left hip pain. 2 views taken 3/10/25.   2. X-ray images were reviewed personally by me and then directly with patient.  3. FINDINGS: Bony structures of the pelvis are intact. No fractures are noted. Soft tissue calcification can be seen adjacent to the greater trochanters bilaterally.     4. IMPRESSION:  See above     ASSESSMENT:      ICD-10-CM ICD-9-CM   1. Left hip pain  M25.552 719.45   2. Fall, initial encounter  W19.XXXA E888.9         PLAN:  Based on patient history, physical exam findings, imaging I am concerned for an occult fracture to the patient's left hip.  We will move forward with MRI for further evaluation.  Next steps were discussed with patient's such as if the MRI comes back showing a fracture, we will instruct her to go to the emergency room or if MRI comes back showing osteoarthritic changes, we can consider an injection therapy at the next visit.    All questions were answered to the best of my ability and all concerns were addressed at this time.    Follow up for above.     This note is dictated using the M*Modal Fluency Direct word recognition program. There are word recognition mistakes that are occasionally missed on review.               [1]   Social History  Socioeconomic History    Marital status: Single   Occupational History    Occupation:    Tobacco Use    Smoking status: Never     Passive exposure: Never    Smokeless tobacco: Never   Substance and Sexual Activity    Alcohol use: No    Drug use: No   Other Topics Concern    Are you pregnant or think you may be? No    Breast-feeding No     Social Drivers of Health     Financial Resource Strain: Low Risk  (2025)    Received [facility]    Overall Financial Resource Strain (CARDIA)     Difficulty of Paying Living Expenses: Not very hard   Food Insecurity: Unknown (2025)    Received from [facility]    Hunger Vital Sign      Worried About Running Out of Food in the Last Year: Never true   Transportation Needs: No Transportation Needs (2/18/2025)    Received from [facility]    PRAPARE - Transportation     Lack of Transportation (Medical): No     Lack of Transportation (Non-Medical): No   Physical Activity: Insufficiently Active (2/18/2025)    Received from [facility]    Exercise Vital Sign     Days of Exercise per Week: 3 days     Minutes of Exercise per Session: 20 min   Stress: No Stress Concern Present (11/2/2022)    [employer] - Occupational Stress Questionnaire     Feeling of Stress : Only a little   Housing Stability: Unknown (2/18/2025)    Received from [facility]    Housing Stability Vital Sign     Unable to Pay for Housing in the Last Year: No   [2]     Current Outpatient Medications:     amLODIPine (NORVASC) 5 MG tablet, Take 1 tablet by mouth once daily., Disp: , Rfl:     azelastine (ASTELIN) 137 mcg (0.1 %) nasal spray, 1 spray by Nasal route., Disp: , Rfl:     fluticasone propionate (FLONASE) 50 mcg/actuation nasal spray, 1 spray (50 mcg total) by Each Nostril route 2 (two) times daily as needed for Rhinitis., Disp: 15 g, Rfl: 0    ibuprofen (ADVIL,MOTRIN) 600 MG tablet, Take 1 tablet (600 mg total) by mouth every 8 (eight) hours as needed for Pain (with food)., Disp: 30 tablet, Rfl: 0    IMBRUVICA 420 mg Tab, Take 1 tablet by mouth once daily., Disp: , Rfl:     loratadine (CLARITIN) 10 mg tablet, Take 1 tablet (10 mg total) by mouth once daily., Disp: 20 tablet, Rfl: 0    losartan (COZAAR) 25 MG tablet, Take 1 tablet (25 mg total) by mouth once daily., Disp: 30 tablet, Rfl: 0    mometasone 0.1% (ELOCON) 0.1 % cream, Apply topically once daily., Disp: 45 g, Rfl: 0    multivitamin (THERAGRAN) per tablet, Take 1 tablet by mouth once daily., Disp: , Rfl:     timolol maleate 0.5% (TIMOPTIC-XE) 0.5 % SolG, Place 1 drop into both eyes 2 (two) times daily., Disp: , Rfl:     albuterol (VENTOLIN HFA)  90 mcg/actuation inhaler, Inhale 2 puffs into the lungs every 6 (six) hours as needed for Wheezing. Rescue, Disp: 18 g, Rfl: 0    gabapentin (NEURONTIN) 100 MG capsule, Take 1 capsule (100 mg total) by mouth 3 (three) times daily., Disp: 90 capsule, Rfl: 11    hydrOXYzine pamoate (VISTARIL) 25 MG Cap, TAKE 1 CAPSULE BY MOUTH EVERY NIGHT AS NEEDED FOR ITCHING (Patient not taking: Reported on 3/25/2025), Disp: , Rfl:     polyethylene glycol (GLYCOLAX) 17 gram/dose powder, Take 17 g by mouth once daily. (Patient not taking: Reported on 3/25/2025), Disp: 510 g, Rfl: 0

## 2025-03-28 ENCOUNTER — HOSPITAL ENCOUNTER (OUTPATIENT)
Dept: RADIOLOGY | Facility: OTHER | Age: 65
Discharge: HOME | End: 2025-03-28
Attending: STUDENT IN AN ORGANIZED HEALTH CARE EDUCATION/TRAINING PROGRAM
Payer: MEDICARE
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
## 2025-03-28 DIAGNOSIS — M25.552 LEFT HIP PAIN: ICD-10-CM

## 2025-03-28 PROCEDURE — 73721 MRI JNT OF LWR EXTRE W/O DYE: CPT | Mod: 26,LT,, | Performed by: RADIOLOGY

## 2025-03-28 PROCEDURE — 73721 MRI JNT OF LWR EXTRE W/O DYE: CPT | Mod: TC,LT

## 2025-04-01 ENCOUNTER — OFFICE VISIT (OUTPATIENT)
Dept: SPORTS MEDICINE | Facility: CLINIC | Age: 65
End: 2025-04-01
Payer: MEDICARE

## 2025-04-01 VITALS
HEART RATE: 78 BPM | DIASTOLIC BLOOD PRESSURE: 83 MMHG | WEIGHT: 159.81 LBS | BODY MASS INDEX: 26.63 KG/M2 | SYSTOLIC BLOOD PRESSURE: 148 MMHG | HEIGHT: 65 IN
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
## 2025-04-01 DIAGNOSIS — S32.82XA MULTIPLE CLOSED FRACTURES OF PELVIS WITHOUT DISRUPTION OF PELVIC RING, INITIAL ENCOUNTER: ICD-10-CM

## 2025-04-01 DIAGNOSIS — M25.552 LEFT HIP PAIN: Primary | ICD-10-CM

## 2025-04-01 PROCEDURE — 99214 OFFICE O/P EST MOD 30 MIN: CPT | Mod: PBBFAC,PN | Performed by: STUDENT IN AN ORGANIZED HEALTH CARE EDUCATION/TRAINING PROGRAM

## 2025-04-01 PROCEDURE — 99999 PR PBB SHADOW E&M-EST. PATIENT-LVL IV: CPT | Mod: PBBFAC,,, | Performed by: STUDENT IN AN ORGANIZED HEALTH CARE EDUCATION/TRAINING PROGRAM

## 2025-04-01 PROCEDURE — 99214 OFFICE O/P EST MOD 30 MIN: CPT | Mod: S$PBB,,, | Performed by: STUDENT IN AN ORGANIZED HEALTH CARE EDUCATION/TRAINING PROGRAM

## 2025-04-01 NOTE — PROGRESS NOTES
CC: left hip pain    64 y.o. Female presents today for follow up evaluation of her left hip pain and to review MRI results.     Attempted treatments: ibuprofen 400mg OTC every 6 hours   Pain score: 2/10  History of trauma/injury: none since last visit  Affecting ADLs: improving with ibuprofen      REVIEW OF SYSTEMS:   Constitution: Patient denies fever or chills.  Eyes: Patient denies eye pain or vision changes.  HEENT: Patient denies ear pain, sore throat, or nasal discharge.  CVS: Patient denies chest pain.  Lungs: Patient denies shortness of breath or cough.  Skin: Patient denies skin rash or itching.    Musculoskeletal: Patient denies recent falls. See HPI.  Psych: Patient denies any current anxiety or nervousness.    PAST MEDICAL HISTORY:   Past Medical History:   Diagnosis Date    Cancer 5yrs ago    leukemia     CLL (chronic lymphocytic leukemia)     Diabetes mellitus        MEDICATIONS:   Current Medications[1]    ALLERGIES:   Review of patient's allergies indicates:   Allergen Reactions    Amoxicillin-pot clavulanate Diarrhea and Hives    Amoxicillin         PHYSICAL EXAMINATION:  There were no vitals taken for this visit.  Vitals signs and nursing note have been reviewed.    General: In no acute distress, well developed, well nourished, no diaphoresis  Eyes: EOM full and smooth, no eye redness or discharge  HENT: normocephalic and atraumatic, neck supple, trachea midline, no nasal discharge  Cardiovascular: no LE edema  Lungs: respirations non-labored, no conversational dyspnea   Neuro: AAOx3, CN2-12 grossly intact  Skin: No rashes, warm and dry  Psychiatric: cooperative, pleasant, mood and affect appropriate for age    IMAGIN. MRI ordered due to left hip pain, taken on 3/28/25.  2. MRI images were reviewed personally by me and then directly with patient.  3. FINDINGS: There is marrow edema consistent with fractures in the following locations: Left anterior acetabulum, left pubic bone, left inferior  pubic ramus. Proximal left femur is unremarkable. There is posttraumatic grade 1 muscle strain of the left hip adductor muscles. There are small inguinal lymph nodes. No adenopathy is seen. Sacrum is intact.     4. IMPRESSION: Multiple pelvic fractures as described above.     ASSESSMENT:      ICD-10-CM ICD-9-CM   1. Left hip pain  M25.552 719.45   2. Fall, subsequent encounter  W19.XXXD V58.89     E888.9   3. Multiple closed fractures of pelvis without disruption of pelvic ring, initial encounter  S32.82XA 808.44         PLAN:  MRI images and report were reviewed with patient at today's visit.  Patient with multiple closed fractures of the pelvis without disruption of the pelvic ring but no femoral fractures.  Given this information, given that this was likely secondary to her fall, we will refer to orthopedic trauma surgery for further evaluation and treatment.  Patient also encouraged to follow up with her PCP for workup regarding possible osteoporosis.    All questions were answered to the best of my ability and all concerns were addressed at this time.    Follow up for above.     This note is dictated using the M*Modal Fluency Direct word recognition program. There are word recognition mistakes that are occasionally missed on review.             [1]   Current Outpatient Medications:     albuterol (VENTOLIN HFA) 90 mcg/actuation inhaler, Inhale 2 puffs into the lungs every 6 (six) hours as needed for Wheezing. Rescue, Disp: 18 g, Rfl: 0    amLODIPine (NORVASC) 5 MG tablet, Take 1 tablet by mouth once daily., Disp: , Rfl:     azelastine (ASTELIN) 137 mcg (0.1 %) nasal spray, 1 spray by Nasal route., Disp: , Rfl:     fluticasone propionate (FLONASE) 50 mcg/actuation nasal spray, 1 spray (50 mcg total) by Each Nostril route 2 (two) times daily as needed for Rhinitis., Disp: 15 g, Rfl: 0    gabapentin (NEURONTIN) 100 MG capsule, Take 1 capsule (100 mg total) by mouth 3 (three) times daily., Disp: 90 capsule, Rfl:  11    hydrOXYzine pamoate (VISTARIL) 25 MG Cap, TAKE 1 CAPSULE BY MOUTH EVERY NIGHT AS NEEDED FOR ITCHING (Patient not taking: Reported on 3/25/2025), Disp: , Rfl:     ibuprofen (ADVIL,MOTRIN) 600 MG tablet, Take 1 tablet (600 mg total) by mouth every 8 (eight) hours as needed for Pain (with food)., Disp: 30 tablet, Rfl: 0    IMBRUVICA 420 mg Tab, Take 1 tablet by mouth once daily., Disp: , Rfl:     loratadine (CLARITIN) 10 mg tablet, Take 1 tablet (10 mg total) by mouth once daily., Disp: 20 tablet, Rfl: 0    losartan (COZAAR) 25 MG tablet, Take 1 tablet (25 mg total) by mouth once daily., Disp: 30 tablet, Rfl: 0    mometasone 0.1% (ELOCON) 0.1 % cream, Apply topically once daily., Disp: 45 g, Rfl: 0    multivitamin (THERAGRAN) per tablet, Take 1 tablet by mouth once daily., Disp: , Rfl:     polyethylene glycol (GLYCOLAX) 17 gram/dose powder, Take 17 g by mouth once daily. (Patient not taking: Reported on 3/25/2025), Disp: 510 g, Rfl: 0    timolol maleate 0.5% (TIMOPTIC-XE) 0.5 % SolG, Place 1 drop into both eyes 2 (two) times daily., Disp: , Rfl:

## 2025-04-01 NOTE — Clinical Note
Good morning,  I am seeing this patient for hip pain (after a fall in early March) and MRI showed that she has multiple closed fractures of the pelvis without disruption of the pelvic ring.  Pain is greatly controlled by ibuprofen and she has no pain with walking and is hemodynamically stable.    I am reaching out to see if one of you would be able to see her for continued evaluation and treatment.   If you feel another provider/department would be more appropriate, please let me know.   Thank you!

## 2025-04-08 ENCOUNTER — HOSPITAL ENCOUNTER (OUTPATIENT)
Dept: RADIOLOGY | Facility: HOSPITAL | Age: 65
Discharge: HOME | End: 2025-04-08
Attending: ORTHOPAEDIC SURGERY
Payer: MEDICARE

## 2025-04-08 ENCOUNTER — OFFICE VISIT (OUTPATIENT)
Dept: ORTHOPEDICS | Facility: CLINIC | Age: 65
End: 2025-04-08
Payer: MEDICARE

## 2025-04-08 VITALS — HEIGHT: 65 IN | WEIGHT: 159.81 LBS | BODY MASS INDEX: 26.63 KG/M2

## 2025-04-08 DIAGNOSIS — M25.552 LEFT HIP PAIN: ICD-10-CM

## 2025-04-08 DIAGNOSIS — S32.82XA MULTIPLE CLOSED FRACTURES OF PELVIS WITHOUT DISRUPTION OF PELVIC RING, INITIAL ENCOUNTER: ICD-10-CM
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
## 2025-04-08 DIAGNOSIS — M80.00XA AGE-RELATED OSTEOPOROSIS WITH CURRENT PATHOLOGICAL FRACTURE, INITIAL ENCOUNTER: ICD-10-CM

## 2025-04-08 DIAGNOSIS — S32.435A CLOSED NONDISPLACED FRACTURE OF ANTERIOR COLUMN OF LEFT ACETABULUM, INITIAL ENCOUNTER: ICD-10-CM

## 2025-04-08 DIAGNOSIS — S32.435A CLOSED NONDISPLACED FRACTURE OF ANTERIOR COLUMN OF LEFT ACETABULUM, INITIAL ENCOUNTER: Primary | ICD-10-CM

## 2025-04-08 PROCEDURE — 99213 OFFICE O/P EST LOW 20 MIN: CPT | Mod: PBBFAC,25 | Performed by: ORTHOPAEDIC SURGERY

## 2025-04-08 PROCEDURE — 99999 PR PBB SHADOW E&M-EST. PATIENT-LVL III: CPT | Mod: PBBFAC,,, | Performed by: ORTHOPAEDIC SURGERY

## 2025-04-08 PROCEDURE — 72170 X-RAY EXAM OF PELVIS: CPT | Mod: TC

## 2025-04-08 PROCEDURE — 72170 X-RAY EXAM OF PELVIS: CPT | Mod: 26,,, | Performed by: RADIOLOGY

## 2025-04-08 PROCEDURE — 99213 OFFICE O/P EST LOW 20 MIN: CPT | Mod: S$PBB,,, | Performed by: ORTHOPAEDIC SURGERY

## 2025-04-08 NOTE — PROGRESS NOTES
"  CC:  Left acetabular fracture      HISTORY       HPI:  64-year-old female, ground level fall about a month ago.  Seen by other providers, x-ray was normal, eventually had MRI 03/28/2025 which demonstrated a nondisplaced left acetabular fracture, anterior column.  She is here today for further orthopedic trauma evaluation     She is now walking unassisted, pain has greatly improved, she never took any opioid pain meds just some over-the-counter medicine.    Pain 2/10, dull and achy left anterior groin    ROS:  Constitutional: Denies fever/chills  Neurological: Denies numbness/tingling (any exceptions noted in orthopaedic exam)   Psychiatric/Behavioral: Denies change in normal mood  Eyes: Denies change in vision  Cardiovascular: Denies chest pain  Respiratory: Denies shortness of breath  Hematologic/Lymphatic: Denies easy bleeding/bruising   Skin: Denies new rash or skin lesions   Gastrointestinal: Denies nausea/vomitting/diarrhea, change in bowel habits, abdominal pain   Allergic/Immunologic: Denies adverse reactions to current medications  Musculoskeletal: see HPI    PAST MEDICAL HISTORY:   Past Medical History:   Diagnosis Date    Cancer 5yrs ago    leukemia     CLL (chronic lymphocytic leukemia)     Diabetes mellitus     Osteoporosis      PAST SURGICAL HISTORY: No past surgical history on file.  FAMILY HISTORY:   Family History   Problem Relation Name Age of Onset    Cancer Mother      Throat cancer Mother      Cirrhosis Father      Cancer Sister      Hypertension Sister      Breast cancer Sister      Lung cancer Sister      Diabetes Brother      Eczema Neg Hx      Psoriasis Neg Hx       SOCIAL HISTORY: Social History[1]  MEDICATIONS: Current Medications[2]  ALLERGIES:   Review of patient's allergies indicates:   Allergen Reactions    Amoxicillin-pot clavulanate Diarrhea and Hives    Amoxicillin          EXAM      VITAL SIGNS:   Ht 5' 5" (1.651 m)   Wt 72.5 kg (159 lb 13.3 oz)   BMI 26.60 kg/m²     "   PE:  General:  no acute distress, appears stated age    Neuro: alert and oriented x3  Psych: normal mood  Head: normocephalic, atraumatic.   Eyes: no scleral icterus  Mouth: moist mucous membranes  Cardiovascular: extremities warm and well perfused  Lungs: breathing comfortably, equal chest rise bilat  Skin: clean, dry, intact (any exceptions noted in below musculoskeletal exam)    Musculoskeletal:  Mild Trendelenburg gait favoring the left    Left lower extremity   No pain with pelvic compression   No hip pain with axial loading or log roll   Hip flexion 90°   Internal rotation of 5, with some discomfort, external rotation 15, no pain   Motor function intact hip flexion, quad, hamstring, gastroc, tibialis anterior, peroneal, EHL, FHL  Sensation intact to light touch saphenous, sural, deep peroneal, superficial peroneal, tibial nerves.  Palpable DP/PT pulse, brisk cap refill        XRAYS:  MRI pelvis demonstrate nondisplaced anterior column acetabular fracture   X-ray pelvis today demonstrate no acute fracture or dislocation  (I independently reviewed and interpreted the above imaging)    MEDICAL DECISION MAKING       Encounter Diagnosis   Name Primary?    Closed nondisplaced fracture of anterior column of left acetabulum, initial encounter Yes       64-year-old female, ground level fall over a month ago with nondisplaced left acetabular fracture, anterior column     She seems to have recovered well  Walking without any gait aids  X-rays show stability of fracture     Follow up with me p.r.n.   Recommended physical therapy to work on strengthening and gait     Referral to [facility] fracture [facility] for treatment of her osteoporosis  Calcium, vitamin-D start now    --weight bearing:  WBAT  --followup:  PRN  --XR at next visit:  None      =====================  Kody Minor MD  Orthopaedic Surgery                   [1]   Social History  Socioeconomic History    Marital status: Single   Occupational History     Occupation:    Tobacco Use    Smoking status: Never     Passive exposure: Never    Smokeless tobacco: Never   Substance and Sexual Activity    Alcohol use: No    Drug use: No   Other Topics Concern    Are you pregnant or think you may be? No    Breast-feeding No     Social Drivers of Health     Financial Resource Strain: Low Risk  (2/18/2025)    Received from [facility]    Overall Financial Resource Strain (CARDIA)     Difficulty of Paying Living Expenses: Not very hard   Food Insecurity: Unknown (2/18/2025)    Received from [facility]    Hunger Vital Sign     Worried About Running Out of Food in the Last Year: Never true   Transportation Needs: No Transportation Needs (2/18/2025)    Received from [facility]    PRAPARE - Transportation     Lack of Transportation (Medical): No     Lack of Transportation (Non-Medical): No   Physical Activity: Insufficiently Active (2/18/2025)    Received from [facility]    Exercise Vital Sign     Days of Exercise per Week: 3 days     Minutes of Exercise per Session: 20 min   Stress: No Stress Concern Present (11/2/2022)    [employer] - Occupational Stress Questionnaire     Feeling of Stress : Only a little   Housing Stability: Unknown (2/18/2025)    Received from [facility]    Housing Stability Vital Sign     Unable to Pay for Housing in the Last Year: No   [2]   Current Outpatient Medications:     albuterol (VENTOLIN HFA) 90 mcg/actuation inhaler, Inhale 2 puffs into the lungs every 6 (six) hours as needed for Wheezing. Rescue, Disp: 18 g, Rfl: 0    amLODIPine (NORVASC) 5 MG tablet, Take 1 tablet by mouth once daily., Disp: , Rfl:     azelastine (ASTELIN) 137 mcg (0.1 %) nasal spray, 1 spray by Nasal route., Disp: , Rfl:     fluticasone propionate (FLONASE) 50 mcg/actuation nasal spray, 1 spray (50 mcg total) by Each Nostril route 2 (two) times daily as needed for Rhinitis., Disp: 15 g, Rfl: 0    gabapentin (NEURONTIN) 100 MG capsule,  Take 1 capsule (100 mg total) by mouth 3 (three) times daily., Disp: 90 capsule, Rfl: 11    hydrOXYzine pamoate (VISTARIL) 25 MG Cap, TAKE 1 CAPSULE BY MOUTH EVERY NIGHT AS NEEDED FOR ITCHING (Patient not taking: Reported on 4/1/2025), Disp: , Rfl:     ibuprofen (ADVIL,MOTRIN) 600 MG tablet, Take 1 tablet (600 mg total) by mouth every 8 (eight) hours as needed for Pain (with food)., Disp: 30 tablet, Rfl: 0    IMBRUVICA 420 mg Tab, Take 1 tablet by mouth once daily., Disp: , Rfl:     loratadine (CLARITIN) 10 mg tablet, Take 1 tablet (10 mg total) by mouth once daily., Disp: 20 tablet, Rfl: 0    losartan (COZAAR) 25 MG tablet, Take 1 tablet (25 mg total) by mouth once daily., Disp: 30 tablet, Rfl: 0    mometasone 0.1% (ELOCON) 0.1 % cream, Apply topically once daily., Disp: 45 g, Rfl: 0    multivitamin (THERAGRAN) per tablet, Take 1 tablet by mouth once daily., Disp: , Rfl:     polyethylene glycol (GLYCOLAX) 17 gram/dose powder, Take 17 g by mouth once daily. (Patient not taking: Reported on 4/1/2025), Disp: 510 g, Rfl: 0    timolol maleate 0.5% (TIMOPTIC-XE) 0.5 % SolG, Place 1 drop into both eyes 2 (two) times daily., Disp: , Rfl:

## 2025-04-09 ENCOUNTER — TELEPHONE (OUTPATIENT)
Dept: ORTHOPEDICS | Facility: CLINIC | Age: 65
End: 2025-04-09
Payer: MEDICARE

## 2025-04-09 NOTE — TELEPHONE ENCOUNTER
Spoke with patient regarding scheduling a orthopedics fracture clinic appointment , patient stated not at this time